# Patient Record
Sex: FEMALE | Employment: FULL TIME | URBAN - METROPOLITAN AREA
[De-identification: names, ages, dates, MRNs, and addresses within clinical notes are randomized per-mention and may not be internally consistent; named-entity substitution may affect disease eponyms.]

---

## 2018-12-24 ENCOUNTER — OFFICE VISIT (OUTPATIENT)
Dept: PRIMARY CARE CLINIC | Age: 43
End: 2018-12-24

## 2018-12-24 VITALS
RESPIRATION RATE: 18 BRPM | DIASTOLIC BLOOD PRESSURE: 84 MMHG | HEART RATE: 89 BPM | HEIGHT: 65 IN | SYSTOLIC BLOOD PRESSURE: 124 MMHG | WEIGHT: 275.2 LBS | OXYGEN SATURATION: 96 % | BODY MASS INDEX: 45.85 KG/M2 | TEMPERATURE: 98.6 F

## 2018-12-24 DIAGNOSIS — G43.109 MIGRAINE WITH AURA AND WITHOUT STATUS MIGRAINOSUS, NOT INTRACTABLE: ICD-10-CM

## 2018-12-24 DIAGNOSIS — Z12.4 SCREENING FOR CERVICAL CANCER: ICD-10-CM

## 2018-12-24 DIAGNOSIS — E66.01 OBESITY, MORBID (HCC): Primary | ICD-10-CM

## 2018-12-24 DIAGNOSIS — Z12.39 BREAST CANCER SCREENING: ICD-10-CM

## 2018-12-24 DIAGNOSIS — Z76.89 ENCOUNTER TO ESTABLISH CARE: ICD-10-CM

## 2018-12-24 PROBLEM — Z87.39 HISTORY OF FUSION OF SPINE FOR SCOLIOSIS: Status: ACTIVE | Noted: 2018-12-24

## 2018-12-24 PROBLEM — F90.9 ADHD: Status: ACTIVE | Noted: 2018-12-24

## 2018-12-24 PROBLEM — Z98.1 HISTORY OF FUSION OF SPINE FOR SCOLIOSIS: Status: ACTIVE | Noted: 2018-12-24

## 2018-12-24 RX ORDER — DEXTROAMPHETAMINE SACCHARATE, AMPHETAMINE ASPARTATE, DEXTROAMPHETAMINE SULFATE AND AMPHETAMINE SULFATE 5; 5; 5; 5 MG/1; MG/1; MG/1; MG/1
20 TABLET ORAL 2 TIMES DAILY
Status: ON HOLD | COMMUNITY
End: 2021-01-15

## 2018-12-24 RX ORDER — RIZATRIPTAN BENZOATE 10 MG/1
10 TABLET ORAL
Status: ON HOLD | COMMUNITY
End: 2021-01-15

## 2018-12-24 RX ORDER — BISMUTH SUBSALICYLATE 262 MG
1 TABLET,CHEWABLE ORAL DAILY
Status: ON HOLD | COMMUNITY
End: 2021-01-15

## 2018-12-24 NOTE — PATIENT INSTRUCTIONS
Eating Healthy Foods: Care Instructions  Your Care Instructions    Eating healthy foods can help lower your risk for disease. Healthy food gives you energy and keeps your heart strong, your brain active, your muscles working, and your bones strong. A healthy diet includes a variety of foods from the basic food groups: grains, vegetables, fruits, milk and milk products, and meat and beans. Some people may eat more of their favorite foods from only one food group and, as a result, miss getting the nutrients they need. So, it is important to pay attention not only to what you eat but also to what you are missing from your diet. You can eat a healthy, balanced diet by making a few small changes. Follow-up care is a key part of your treatment and safety. Be sure to make and go to all appointments, and call your doctor if you are having problems. It's also a good idea to know your test results and keep a list of the medicines you take. How can you care for yourself at home? Look at what you eat  · Keep a food diary for a week or two and record everything you eat or drink. Track the number of servings you eat from each food group. · For a balanced diet every day, eat a variety of:  ? 6 or more ounce-equivalents of grains, such as cereals, breads, crackers, rice, or pasta, every day. An ounce-equivalent is 1 slice of bread, 1 cup of ready-to-eat cereal, or ½ cup of cooked rice, cooked pasta, or cooked cereal.  ? 2½ cups of vegetables, especially:  § Dark-green vegetables such as broccoli and spinach. § Orange vegetables such as carrots and sweet potatoes. § Dry beans (such as diop and kidney beans) and peas (such as lentils). ? 2 cups of fresh, frozen, or canned fruit. A small apple or 1 banana or orange equals 1 cup. ? 3 cups of nonfat or low-fat milk, yogurt, or other milk products. ? 5½ ounces of meat and beans, such as chicken, fish, lean meat, beans, nuts, and seeds.  One egg, 1 tablespoon of peanut butter, ½ ounce nuts or seeds, or ¼ cup of cooked beans equals 1 ounce of meat. · Learn how to read food labels for serving sizes and ingredients. Fast-food and convenience-food meals often contain few or no fruits or vegetables. Make sure you eat some fruits and vegetables to make the meal more nutritious. · Look at your food diary. For each food group, add up what you have eaten and then divide the total by the number of days. This will give you an idea of how much you are eating from each food group. See if you can find some ways to change your diet to make it more healthy. Start small  · Do not try to make dramatic changes to your diet all at once. You might feel that you are missing out on your favorite foods and then be more likely to fail. · Start slowly, and gradually change your habits. Try some of the following:  ? Use whole wheat bread instead of white bread. ? Use nonfat or low-fat milk instead of whole milk. ? Eat brown rice instead of white rice, and eat whole wheat pasta instead of white-flour pasta. ? Try low-fat cheeses and low-fat yogurt. ? Add more fruits and vegetables to meals and have them for snacks. ? Add lettuce, tomato, cucumber, and onion to sandwiches. ? Add fruit to yogurt and cereal.  Enjoy food  · You can still eat your favorite foods. You just may need to eat less of them. If your favorite foods are high in fat, salt, and sugar, limit how often you eat them, but do not cut them out entirely. · Eat a wide variety of foods. Make healthy choices when eating out  · The type of restaurant you choose can help you make healthy choices. Even fast-food chains are now offering more low-fat or healthier choices on the menu. · Choose smaller portions, or take half of your meal home. · When eating out, try:  ? A veggie pizza with a whole wheat crust or grilled chicken (instead of sausage or pepperoni).   ? Pasta with roasted vegetables, grilled chicken, or marinara sauce instead of cream sauce. ? A vegetable wrap or grilled chicken wrap. ? Broiled or poached food instead of fried or breaded items. Make healthy choices easy  · Buy packaged, prewashed, ready-to-eat fresh vegetables and fruits, such as baby carrots, salad mixes, and chopped or shredded broccoli and cauliflower. · Buy packaged, presliced fruits, such as melon or pineapple. · Choose 100% fruit or vegetable juice instead of soda. Limit juice intake to 4 to 6 oz (½ to ¾ cup) a day. · Blend low-fat yogurt, fruit juice, and canned or frozen fruit to make a smoothie for breakfast or a snack. Where can you learn more? Go to http://fidel-soraida.info/. Enter T756 in the search box to learn more about \"Eating Healthy Foods: Care Instructions. \"  Current as of: March 29, 2018  Content Version: 11.8  © 7842-7575 [employer], [employer] Care instructions adapted under license by [employer] (which disclaims liability or warranty for this information). If you have questions about a medical condition or this instruction, always ask your healthcare professional. [place] [address] any warranty or liability for your use of this information.

## 2018-12-24 NOTE — PROGRESS NOTES
HPI:     Chief Complaint   Patient presents with   [address]        Patient is a 41 y.o. female who presents as new patient to establish care. Patient has history of ADHD, migraines, and obesity. Recently moved to the area from [place] about a year ago. Patient works as a . ADHD - diagnosed in 2010. Doing well with current therapy, Adderall 20mg BID. Denies any medication side effects. She is in the process of establishing with psych here. Migraine - reports having migraines since age 17. Reports 2-3 migraines per month. Associated symptoms include photophobia and phonophobia. Describes aura symptoms of visual floaters or dizziness. Denies any nausea, vomiting, weakness, paresthesias with headaches. Reports last migraine was earlier this month. As needed Maxalt has been working very well for her. Obesity - reports lap band surgery in 2008 and successfully lost 90 lbs which she was able to keep off for 8 years. However lap band failed in 2016 and was removed. Has since gained weight back. Patient reports she is eligible for revision, but is not interested at this time. She would rather work on diet and exercise to lose weight. She would like to meet with dietician. Patient has not yet started screening for breast cancer, she is interested in getting mammogram.   Patient would also like referral to OB/GYN for routine exam, pap. Reports history of irregular periods. Patient denies fever, chills, dizziness, headache, fatigue, syncope, chest pain, palpitations, dyspnea, abdominal pain, change in appetite, nausea, vomiting, constipation, and diarrhea. Patient Active Problem List   Diagnosis Code    Ob[facility] morbid ([facility] E66.01    Migraine with aura and without status migrainosus, not intractable G43. 109    ADHD F90.9    History of fusion of spine for scoliosis Z98.1, Z87.39     Current Outpatient Medications   Medication Sig Dispense Refill  dextroamphetamine-amphetamine (ADDERALL) 20 mg tablet Take 20 mg by mouth two (2) times a day.  rizatriptan (MAXALT) 10 mg tablet Take 10 mg by mouth once as needed for Migraine. May repeat in 2 hours if needed      multivitamin (ONE A DAY) tablet Take 1 Tab by mouth daily. Allergies   Allergen Reactions    Pcn [Penicillins] Rash     Facial rash     Past Medical History:   Diagnosis Date    ADHD     History of fusion of spine for scoliosis     Migraines     Osteoarthritis     lower spine     Past Surgical History:   Procedure Laterality Date    HX  SECTION      x1    HX CHOLECYSTECTOMY  2011    HX LAP GASTRIC BYPASS  2008    HX OTHER SURGICAL      double spinal fusion    HX OTHER SURGICAL  2016    lap band removal    HX TONSILLECTOMY      HX WISDOM TEETH EXTRACTION       Family History   Problem Relation Age of Onset    Other Mother         cerebral palsy    Osteoporosis Mother     Heart Disease Mother         Herlene Clonts valve\"   Narvis Zhang Migraines Mother     Hypertension Father     COPD Father     Macular Degen Father     Lung Cancer Maternal Grandmother     Thyroid Disease Maternal Grandmother         hashimoto    Stroke Maternal Grandfather     Cancer Paternal Grandmother         leukemia    Thyroid Disease Maternal Aunt         hashimoto    Heart Attack Paternal Aunt      Social History     Tobacco Use    Smoking status: Never Smoker    Smokeless tobacco: Never Used   Substance Use Topics    Alcohol use: No     Frequency: Never          ROS:   Pertinent items are noted in HPI. Objective:     Vitals:    18 0855   BP: 124/84   Pulse: 89   Resp: 18   Temp: 98.6 °F (37 °C)   TempSrc: Oral   SpO2: 96%   Weight: 275 lb 3.2 oz (124.8 kg)   Height: 5' 4.5\" (1.638 m)        Vitals and Nurse Documentation reviewed.     Physical Examination:   General appearance - alert, well appearing, and in no distress  Mental status - alert, oriented to person, place, and time, normal mood, behavior, speech, dress, motor activity, and thought processes  Eyes - pupils equal and reactive, sclera white, conjunctiva pink   Ears - bilateral TM's and external ear canals normal  Nose - normal and patent, no erythema, discharge or polyps  Mouth - mucous membranes moist, pharynx normal without lesions  Neck - supple, no significant adenopathy  Chest - clear to auscultation, no wheezes, rales or rhonchi, symmetric air entry  Heart - normal rate, regular rhythm, normal S1, S2, no murmurs, rubs, clicks or gallops  Abdomen - soft, nontender, nondistended, no masses or organomegaly  Neurological - alert, oriented, normal speech, no focal findings or movement disorder noted  Extremities - peripheral pulses normal, no pedal edema, no clubbing or cyanosis      Assessment/ Plan:   Diagnoses and all orders for this visit:    1. Obesity, morbid ([address]  -     History of lap band in 2008, with removal in 2016. Not interested in revision at this time. She is working on diet and exercise. -     Discussed lifestyle changes, daily physical activity, and advised 150 minutes of exercise weekly. Discussed healthy diet choices and limiting fried, fatty foods, fast foods, processed foods, sugar-sweetened beverages/soda, and added sugars. Increase fruits, vegetables, low-fat dairy products, lean proteins, and whole grains.    -     Patient would like to meet with dietician.    -     REFERRAL TO DIETITIAN    2. Migraine with aura and without status migrainosus, not intractable        -     Stable. Migraines 2-3 times per month, well controlled with as needed Maxalt    3. Breast cancer screening  -     [facility] 3D FILI W MAMMO BI SCREENING INCL CAD; Future    4. Screening for cervical cancer  -     REFERRAL TO OBSTETRICS AND GYNECOLOGY    5. Encounter to establish care    Patient is interested in getting flu shot, but reports she has a cold and would prefer to wait until she is feeling better.     Patient reports that she received Tdap in 2016. She will obtain records and bring next time. Follow-up Disposition:  Return in about 4 weeks (around 1/21/2019) for Return for CPE (Physical Exam), fasting labs. I have discussed the diagnosis with the patient and the intended plan as seen in the above orders. Advised prompt follow-up if symptoms worsen or fail to improve and symptoms that would warrant emergent evaluation in ED. The patient has received an after-visit summary and questions were answered concerning future plans. I have discussed medication side effects and warnings with the patient as well. Patient expressed understanding and is in agreement with the diagnosis and plan.

## 2019-07-10 ENCOUNTER — HOSPITAL ENCOUNTER (EMERGENCY)
Age: 44
Discharge: HOME | End: 2019-07-10
Attending: EMERGENCY MEDICINE | Admitting: EMERGENCY MEDICINE
Payer: OTHER GOVERNMENT

## 2019-07-10 VITALS
RESPIRATION RATE: 12 BRPM | BODY MASS INDEX: 49.7 KG/M2 | DIASTOLIC BLOOD PRESSURE: 100 MMHG | WEIGHT: 293 LBS | HEART RATE: 113 BPM | OXYGEN SATURATION: 98 % | TEMPERATURE: 98.5 F | SYSTOLIC BLOOD PRESSURE: 155 MMHG

## 2019-07-10 DIAGNOSIS — M62.838 MUSCLE SPASM: ICD-10-CM

## 2019-07-10 DIAGNOSIS — S76.011A STRAIN OF FLEXOR MUSCLE OF RIGHT HIP, INITIAL ENCOUNTER: Primary | ICD-10-CM

## 2019-07-10 PROCEDURE — 74011250637 HC RX REV CODE- 250/637: Performed by: EMERGENCY MEDICINE

## 2019-07-10 PROCEDURE — 99282 EMERGENCY DEPT VISIT SF MDM: CPT

## 2019-07-10 PROCEDURE — 75810000123 HC INJ'S ANES/STEROID AGT PERIPH NERVE

## 2019-07-10 PROCEDURE — 74011000250 HC RX REV CODE- 250: Performed by: EMERGENCY MEDICINE

## 2019-07-10 RX ORDER — BUPIVACAINE HYDROCHLORIDE 5 MG/ML
10 INJECTION, SOLUTION EPIDURAL; INTRACAUDAL
Status: COMPLETED | OUTPATIENT
Start: 2019-07-11 | End: 2019-07-10

## 2019-07-10 RX ORDER — ACETAMINOPHEN 500 MG
1000 TABLET ORAL
Status: COMPLETED | OUTPATIENT
Start: 2019-07-11 | End: 2019-07-10

## 2019-07-10 RX ORDER — IBUPROFEN 400 MG/1
800 TABLET ORAL
Status: DISCONTINUED | OUTPATIENT
Start: 2019-07-11 | End: 2019-07-10

## 2019-07-10 RX ORDER — IBUPROFEN 800 MG/1
800 TABLET ORAL
Qty: 20 TAB | Refills: 0 | Status: SHIPPED | OUTPATIENT
Start: 2019-07-10 | End: 2019-07-17

## 2019-07-10 RX ORDER — ACETAMINOPHEN 500 MG
1000 TABLET ORAL
Qty: 20 TAB | Refills: 0 | Status: ON HOLD | OUTPATIENT
Start: 2019-07-10 | End: 2021-01-15

## 2019-07-10 RX ORDER — IBUPROFEN 200 MG
200 TABLET ORAL
COMMUNITY
End: 2019-07-10 | Stop reason: ALTCHOICE

## 2019-07-10 RX ADMIN — ACETAMINOPHEN 1000 MG: 500 TABLET ORAL at 23:36

## 2019-07-10 RX ADMIN — BUPIVACAINE HYDROCHLORIDE 50 MG: 5 INJECTION, SOLUTION EPIDURAL; INTRACAUDAL; PERINEURAL at 23:32

## 2019-07-11 NOTE — ED TRIAGE NOTES
Triage: Pt was at a restaurant went to get up and had increasing pain to her right hip since earlier today.

## 2019-07-11 NOTE — ED PROVIDER NOTES
The history is provided by the patient. Hip Pain    This is a new problem. The current episode started 3 to 5 hours ago. The problem occurs constantly. The problem has been rapidly worsening. Pain location: right anterior and medial hip. The pain is severe. Associated symptoms include limited range of motion (2/2 pain and with lifting leg and hip flexion) and stiffness. Exacerbated by: sitting and standing. Treatments tried: ibuprofen. The treatment provided no relief. There has been no history of extremity trauma.         Past Medical History:   Diagnosis Date    ADHD     History of fusion of spine for scoliosis     Migraines     Osteoarthritis     lower spine       Past Surgical History:   Procedure Laterality Date    HX  SECTION      x1    HX CHOLECYSTECTOMY  2011    HX LAP GASTRIC BYPASS  2008    HX OTHER SURGICAL      double spinal fusion    HX OTHER SURGICAL  2016    lap band removal    HX TONSILLECTOMY      HX WISDOM TEETH EXTRACTION           Family History:   Problem Relation Age of Onset    Other Mother         cerebral palsy    Osteoporosis Mother     Heart Disease Mother         Nellene Bees valve\"   Alex Drain Migraines Mother     Hypertension Father     COPD Father     Macular Degen Father     Lung Cancer Maternal Grandmother     Thyroid Disease Maternal Grandmother         hashimoto    Stroke Maternal Grandfather     Cancer Paternal Grandmother         leukemia    Thyroid Disease Maternal Aunt         hashimoto    Heart Attack Paternal Aunt        Social History     Socioeconomic History    Marital status:      Spouse name: Not on file    Number of children: Not on file    Years of education: Not on file    Highest education level: Not on file   Occupational History    Not on file   Social Needs    Financial resource strain: Not on file    Food insecurity:     Worry: Not on file     Inability: Not on file    Transportation needs:     Medical: Not on file Non-medical: Not on file   Tobacco Use    Smoking status: Never Smoker    Smokeless tobacco: Never Used   Substance and Sexual Activity    Alcohol use: No     Frequency: Never    Drug use: No    Sexual activity: Yes     Partners: Male     Birth control/protection: Surgical     Comment: coils    Lifestyle    Physical activity:     Days per week: Not on file     Minutes per session: Not on file    Stress: Not on file   Relationships    Social connections:     Talks on phone: Not on file     Gets together: Not on file     Attends Nondenominational service: Not on file     Active member of club or organization: Not on file     Attends meetings of clubs or organizations: Not on file     Relationship status: Not on file    Intimate partner violence:     Fear of current or ex partner: Not on file     Emotionally abused: Not on file     Physically abused: Not on file     Forced sexual activity: Not on file   Other Topics Concern    Not on file   Social History Narrative    Not on file         ALLERGIES: Pcn [penicillins]    Review of Systems   Musculoskeletal: Positive for stiffness. All other systems reviewed and are negative. Vitals:    07/10/19 2314   BP: (!) 155/100   Pulse: (!) 113   Resp: 12   Temp: 98.5 °F (36.9 °C)   SpO2: 98%   Weight: 133.4 kg (294 lb 1.5 oz)            Physical Exam   Constitutional: She appears well-developed and well-nourished. No distress. HENT:   Head: Normocephalic and atraumatic. Eyes: Conjunctivae are normal.   Neck: Neck supple. Cardiovascular: Normal rate, regular rhythm and normal heart sounds. Pulmonary/Chest: Effort normal and breath sounds normal. No respiratory distress. Abdominal: She exhibits no distension. There is no tenderness. There is no rebound and no guarding. Musculoskeletal: She exhibits no deformity. Right hip: She exhibits tenderness (over hip flexor musculature and pain with passive extension of hip ).  She exhibits normal range of motion, normal strength, no bony tenderness, no swelling, no crepitus and no deformity. Neurological: She is alert. No cranial nerve deficit. Skin: Skin is warm and dry. Psychiatric: Her behavior is normal.   Nursing note and vitals reviewed. MDM     41 y.o. female presents with pain radiating across the front of her hip suddenly which appears consistent with a hip flexor strain and spasm. Local anaesthesia was applied to area and Pt went from unable to ambulate to moving around with minimal discomfort. Patient was recommended to take short course of scheduled NSAIDs and engage in early mobility as definitive treatment. Plan to follow up with PCP as needed and return precautions discussed for worsening or new concerning symptoms. Procedures    Procedure Note: Trigger Point Injection for Myofascial pain    Performed by Sarina Trevino MD  Indication: muscle/myofascial pain  Muscle body and tendon sheath of the right hip flexor muscle(s) were injected with 0.5% bupivacaine under sterile technique for release of muscle spasm/pain with ultrasound guidance to take care in avoiding the femoral nerve and vasculature. Patient tolerated well with immediate improvement of symptoms and no immediate complications following procedure.     CPT Code:     1 or 2 muscle bodies: [id number]

## 2019-07-29 ENCOUNTER — OFFICE VISIT (OUTPATIENT)
Dept: PRIMARY CARE CLINIC | Age: 44
End: 2019-07-29

## 2019-07-29 VITALS
HEART RATE: 98 BPM | TEMPERATURE: 98.5 F | HEIGHT: 65 IN | RESPIRATION RATE: 18 BRPM | DIASTOLIC BLOOD PRESSURE: 90 MMHG | SYSTOLIC BLOOD PRESSURE: 140 MMHG | BODY MASS INDEX: 49.7 KG/M2 | OXYGEN SATURATION: 97 %

## 2019-07-29 DIAGNOSIS — Z98.1 HISTORY OF FUSION OF SPINE FOR SCOLIOSIS: ICD-10-CM

## 2019-07-29 DIAGNOSIS — S76.011A STRAIN OF FLEXOR MUSCLE OF RIGHT HIP, INITIAL ENCOUNTER: Primary | ICD-10-CM

## 2019-07-29 DIAGNOSIS — Z87.39 HISTORY OF FUSION OF SPINE FOR SCOLIOSIS: ICD-10-CM

## 2019-07-29 RX ORDER — CYCLOBENZAPRINE HCL 10 MG
10 TABLET ORAL
Qty: 21 TAB | Refills: 0 | Status: ON HOLD | OUTPATIENT
Start: 2019-07-29 | End: 2021-01-15

## 2019-07-29 RX ORDER — CYCLOBENZAPRINE HCL 5 MG
10 TABLET ORAL
Qty: 21 TAB | Refills: 0 | Status: SHIPPED | OUTPATIENT
Start: 2019-07-29 | End: 2019-07-29

## 2019-07-29 RX ORDER — IBUPROFEN 200 MG
TABLET ORAL
Status: ON HOLD | COMMUNITY
End: 2021-01-15

## 2019-07-29 NOTE — PATIENT INSTRUCTIONS
Hip Flexor Strain: Rehab Exercises  Introduction  Here are some examples of exercises for you to try. The exercises may be suggested for a condition or for rehabilitation. Start each exercise slowly. Ease off the exercises if you start to have pain. You will be told when to start these exercises and which ones will work best for you. How to do the exercises  Pelvic tilt with marching    1. Lie on your back with your knees bent and your feet flat on the floor. 2. Tighten your belly muscles and buttocks, and press your lower back to the floor. 3. Keeping your knees bent, lift and then lower one leg up off the floor, and then lift and lower your other leg like you are marching. Each time you lift your leg, hold that position for about 6 seconds before lowering your leg. 4. Repeat 8 to 12 times. Scissors    1. Lie on your back with your knees bent at a 90-degree angle and your feet off the floor. 2. Tighten your belly muscles and buttocks, and press your lower back to the floor. 3. Slowly straighten one leg, and hold that position for about 6 seconds. Your leg should be about 12 inches off the floor. Bring that leg back to the starting position, and then straighten your other leg. Hold that position for about 6 seconds, and then switch legs again. 4. Repeat 8 to 12 times. Hamstring stretch (lying down)    1. Lie flat on your back with your legs straight. If you feel discomfort in your back, place a small towel roll under your lower back. 2. Holding the back of your affected leg for support, lift your leg straight up and toward your body until you feel a stretch at the back of your thigh. 3. Hold the stretch for at least 30 seconds. 4. Repeat 2 to 4 times. Quadricep and hip flexor stretch (lying on side)    1. Lie on your side with your good leg flat on the floor and your hand supporting your head.   2. Bend your top leg, and reach behind you to grab the front of that foot or ankle with your other hand.  3. Stretch your leg back by pulling your foot toward your buttock. You will feel the stretch in the front of your thigh. If this causes stress on your knee, do not do this stretch. 4. Hold the stretch for at least 15 to 30 seconds. 5. Repeat 2 to 4 times. Hip flexor stretch (kneeling)    1. Kneel on your affected leg and bend your good leg out in front of you, with that foot flat on the floor. If you feel discomfort in the front of your knee, place a towel under your knee. 2. Keeping your back straight, slowly push your hips forward until you feel a stretch in the upper thigh of your back leg and hip. 3. Hold the stretch for at least 15 to 30 seconds. 4. Repeat 2 to 4 times. Hip flexor stretch (edge of table)    1. Lie flat on your back on a table or flat bench, with your knees and lower legs hanging off the edge of the table. 2. Grab your good leg at the knee, and pull that knee back toward your chest. Relax your affected leg and let it hang down toward the floor until you feel a stretch in the upper thigh of your affected leg and hip. 3. Hold the stretch for at least 15 to 30 seconds. 4. Repeat 2 to 4 times. Follow-up care is a key part of your treatment and safety. Be sure to make and go to all appointments, and call your doctor if you are having problems. It's also a good idea to know your test results and keep a list of the medicines you take. Where can you learn more? Go to http://fidel-soraida.info/. Enter D310 in the search box to learn more about \"Hip Flexor Strain: Rehab Exercises. \"  Current as of: September 20, 2018  Content Version: 12.1  © 2235-5674 [employer]. Care instructions adapted under license by [employer] (which disclaims liability or warranty for this information).  If you have questions about a medical condition or this instruction, always ask your healthcare professional. Jennifer [employer] disclaims any warranty or liability for your use of this information.

## 2019-07-29 NOTE — PROGRESS NOTES
HPI:     Chief Complaint   Patient presents with    Groin Pain     started 2 weeks ago after sitting in car for 3 hours, shooting pains in middle of groin, can barely walk because of the pain, yesterday pain started to get worse and now is in pain all the time. Patient denies it being legs. Patient is a 41 y.o. female with significant history of ADHD, migraines, obesity who presents for evaluation of inner hip/groin pain.       Patient reports 3 week history of intermittent right anterior medial hip/groin pain. Reports her symptoms started after prolonged standing at an event in [place]. Symptoms were exacerbated during the 2-3 hour car ride home to [place]. Otherwise denies any known trauma or change in her level of activity. She was evaluated at [facility] on 7/10/19 for her symptoms and diagnosed with hip flexor strain/spasm. Trigger point injection was provided to the area with great relief. Today patient reports that symptoms improved after her ED visit, but worsened again yesterday. She describes pain as severe, sharp. She has difficulty ambulating due to the pain. Lifting her right leg is painful. Feels stiff. Reports that pain is aggravated by activity and ambulation and alleviated by rest.  She has very minimal discomfort at rest.  She has been taking ibuprofen and tylenol with some relief. She reports history of similar \"twinge\" in same area in the past, but usually resolves on its own. Does have history of back pain/spinal OA/hx of spinal fusion for scoliosis. She denies any worsening of her back pain from baseline. Denies LE radiation pain, numbness, paresthesias, weakness, bowel/bladder dysfunction, saddle anesthesia, abnormal vaginal bleeding, hematuria, fevers. Patient Active Problem List   Diagnosis Code    Obesity, morbid ([facility] [address] E66.01    Migraine with aura and without status migrainosus, not intractable G43. 109    ADHD F90.9    History of fusion of spine for scoliosis Z98.1, Z87.39     Current Outpatient Medications   Medication Sig Dispense Refill    ibuprofen (MOTRIN) 200 mg tablet Take  by mouth every six (6) hours as needed for Pain.  cyclobenzaprine (FLEXERIL) 5 mg tablet Take 2 Tabs by mouth three (3) times daily as needed for Muscle Spasm(s). 21 Tab 0    acetaminophen (TYLENOL) 500 mg tablet Take 2 Tabs by mouth every six (6) hours as needed for Pain. 20 Tab 0    multivitamin (ONE A DAY) tablet Take 1 Tab by mouth daily.  dextroamphetamine-amphetamine (ADDERALL) 20 mg tablet Take 20 mg by mouth two (2) times a day.  rizatriptan (MAXALT) 10 mg tablet Take 10 mg by mouth once as needed for Migraine. May repeat in 2 hours if needed       Allergies   Allergen Reactions    Pcn [Penicillins] Rash     Facial rash     Past Medical History:   Diagnosis Date    ADHD     History of fusion of spine for scoliosis     Migraines     Osteoarthritis     lower spine          ROS:   Pertinent items are noted in HPI. Objective:     Vitals:    07/29/19 1527   BP: 140/90   Pulse: 98   Resp: 18   Temp: 98.5 °F (36.9 °C)   TempSrc: Oral   SpO2: 97%   Height: 5' 4.5\" (1.638 m)        Vitals and Nurse Documentation reviewed. Physical Examination:   General appearance - alert, well appearing, and in no distress  Mental status - alert, oriented to person, place, and time, normal mood, behavior, speech, dress, motor activity, and thought processes  Chest - clear to auscultation, no wheezes, rales or rhonchi, symmetric air entry  Heart - normal rate, regular rhythm, normal S1, S2, no murmurs, rubs, clicks or gallops  Neurological - alert, oriented, normal speech, no focal findings or movement disorder noted  Musculoskeletal - exam greatly limited by patient discomfort and habitus. Unable to step onto exam table. She has tenderness to palpation of right anterior medial hip flexor musculature/groin. ROM is limited by patient discomfort.   No obvious deformity noted.   Extremities - peripheral pulses normal, no pedal edema, no clubbing or cyanosis, no edema, redness or tenderness in the calves or thighs    Assessment/ Plan:   Diagnoses and all orders for this visit:    1. Strain of flexor muscle of right hip, initial encounter  -     Patient presentation and exam findings consistent with hip flexor strain/spasm. She did improve after trigger point injection in ED and ibuprofen/tylenol. However symptoms returned yesterday. May take flexeril as needed. Will refer to PT and orthopedics for further evaluation. Discussed importance of staying active, avoiding bedrest, muscle strengthening, while avoiding strenuous activity for best long term outcomes. May use ice/heat as needed. -     cyclobenzaprine (FLEXERIL) 10 mg tablet; Take 1 Tab by mouth three (3) times daily as needed for Muscle Spasm(s). Medication benefits, risks, indication, dosage, potential adverse effects, and alternate medication options were discussed with patient who expressed understanding. Advised that med may cause drowsiness, sedation, dizziness and to take at bedtime. Do not drink alcohol while taking med or drive.         -     REFERRAL TO PHYSICAL THERAPY  -     REFERRAL TO ORTHOPEDICS    2. History of fusion of spine for scoliosis  -     REFERRAL TO ORTHOPEDICS       Follow-up and Dispositions    · Return if symptoms worsen or fail to improve. I have discussed the diagnosis with the patient and the intended plan as seen in the above orders. Advised prompt follow-up if symptoms worsen or fail to improve and symptoms that would warrant emergent evaluation in ED. The patient has received an after-visit summary and questions were answered concerning future plans. I have discussed medication side effects and warnings with the patient as well. Patient expressed understanding and is in agreement with the diagnosis and plan.

## 2019-07-29 NOTE — PROGRESS NOTES
Malik Perez is a 41 y.o. female    Chief Complaint   Patient presents with    Groin Pain     started 2 weeks ago after sitting in car for 3 hours, shooting pains in middle of groin, can barely walk because of the pain, yesterday pain started to get worse and now is in pain all the time. Patient denies it being legs. 1. Have you been to the ER, urgent care clinic since your last visit? Hospitalized since your last visit? [facility] 7/10/19 For same reason    2. Have you seen or consulted any other health care providers outside of the [address] [place] [address] since your last visit? Include any pap smears or colon screening. No    No flowsheet data found.      Health Maintenance Due   Topic Date Due    BREAST CANCER SCRN MAMMOGRAM  12/16/1995    DTaP/Tdap/Td series (1 - Tdap) 12/16/1998    PAP AKA CERVICAL CYTOLOGY  12/16/1998

## 2019-09-17 ENCOUNTER — OFFICE VISIT (OUTPATIENT)
Dept: BEHAVIORAL/MENTAL HEALTH CLINIC | Age: 44
End: 2019-09-17

## 2019-09-17 ENCOUNTER — TELEPHONE (OUTPATIENT)
Dept: NEUROLOGY | Age: 44
End: 2019-09-17

## 2019-09-17 VITALS
DIASTOLIC BLOOD PRESSURE: 99 MMHG | HEIGHT: 65 IN | BODY MASS INDEX: 48.48 KG/M2 | HEART RATE: 73 BPM | SYSTOLIC BLOOD PRESSURE: 126 MMHG | WEIGHT: 291 LBS

## 2019-09-17 DIAGNOSIS — F90.9 ATTENTION DEFICIT HYPERACTIVITY DISORDER (ADHD), UNSPECIFIED ADHD TYPE: Primary | ICD-10-CM

## 2019-09-17 RX ORDER — ESCITALOPRAM OXALATE 5 MG/1
5 TABLET ORAL DAILY
Qty: 30 TAB | Refills: 1 | Status: SHIPPED | OUTPATIENT
Start: 2019-09-17 | End: 2021-01-15
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
## 2019-09-17 NOTE — TELEPHONE ENCOUNTER
----- Message from González Land sent at 9/17/2019 10:59 AM EDT -----  Regarding: Dr. Ramsey Lux  Pt requesting a call back to schedule NP appt. Pt referred by Dr. Douglas Sullivan [phone] for neuro-psych exam. Best contact [phone].

## 2019-09-17 NOTE — TELEPHONE ENCOUNTER
Called referring Dr and requested referral and reason for testing. They are sending referral and notes as they are sensitive in system. Called pt and left v/m for return call.

## 2019-10-21 ENCOUNTER — HOSPITAL ENCOUNTER (OUTPATIENT)
Dept: MRI IMAGING | Age: 44
Discharge: HOME | End: 2019-10-21
Attending: ORTHOPAEDIC SURGERY
Payer: OTHER GOVERNMENT

## 2019-10-21 DIAGNOSIS — M51.36 DDD (DEGENERATIVE DISC DISEASE), LUMBAR: ICD-10-CM

## 2019-10-21 DIAGNOSIS — M41.9 KYPHOSCOLIOSIS: ICD-10-CM

## 2019-10-21 DIAGNOSIS — M43.10 SPONDYLISTHESIS: ICD-10-CM

## 2019-10-21 PROCEDURE — 72148 MRI LUMBAR SPINE W/O DYE: CPT

## 2019-11-27 ENCOUNTER — HOSPITAL ENCOUNTER (OUTPATIENT)
Dept: INTERVENTIONAL RADIOLOGY/VASCULAR | Age: 44
Discharge: HOME | End: 2019-11-27
Attending: PHYSICIAN ASSISTANT | Admitting: RADIOLOGY
Payer: OTHER GOVERNMENT

## 2019-11-27 VITALS
OXYGEN SATURATION: 100 % | RESPIRATION RATE: 16 BRPM | SYSTOLIC BLOOD PRESSURE: 151 MMHG | DIASTOLIC BLOOD PRESSURE: 95 MMHG | HEART RATE: 78 BPM

## 2019-11-27 DIAGNOSIS — M41.9 KYPHOSCOLIOSIS AND SCOLIOSIS: ICD-10-CM

## 2019-11-27 DIAGNOSIS — M51.36 DDD (DEGENERATIVE DISC DISEASE), LUMBAR: ICD-10-CM

## 2019-11-27 PROCEDURE — 74011250636 HC RX REV CODE- 250/636: Performed by: RADIOLOGY

## 2019-11-27 PROCEDURE — 74011000250 HC RX REV CODE- 250: Performed by: RADIOLOGY

## 2019-11-27 PROCEDURE — 64493 INJ PARAVERT F JNT L/S 1 LEV: CPT

## 2019-11-27 RX ORDER — TRIAMCINOLONE ACETONIDE 40 MG/ML
80 INJECTION, SUSPENSION INTRA-ARTICULAR; INTRAMUSCULAR
Status: COMPLETED | OUTPATIENT
Start: 2019-11-27 | End: 2019-11-27

## 2019-11-27 RX ORDER — DEXAMETHASONE SODIUM PHOSPHATE 10 MG/ML
10 INJECTION INTRAMUSCULAR; INTRAVENOUS ONCE
Status: DISCONTINUED | OUTPATIENT
Start: 2019-11-27 | End: 2019-11-27

## 2019-11-27 RX ORDER — LIDOCAINE HYDROCHLORIDE 10 MG/ML
10 INJECTION, SOLUTION EPIDURAL; INFILTRATION; INTRACAUDAL; PERINEURAL
Status: COMPLETED | OUTPATIENT
Start: 2019-11-27 | End: 2019-11-27

## 2019-11-27 RX ADMIN — LIDOCAINE HYDROCHLORIDE 10 ML: 10 INJECTION, SOLUTION EPIDURAL; INFILTRATION; INTRACAUDAL; PERINEURAL at 08:23

## 2019-11-27 RX ADMIN — TRIAMCINOLONE ACETONIDE 80 MG: 40 INJECTION, SUSPENSION INTRA-ARTICULAR; INTRAMUSCULAR at 08:24

## 2019-11-27 NOTE — DISCHARGE INSTRUCTIONS
[facility]  Special Procedures/Radiology Department      Steroidal Injection      Go home and rest.     No vigorous physical activity today. Be aware that numbness and/or tingling can occur up to 24 hours after the injection. No driving today. Resume your previous diet. Resume your previous medications. Depending on your job, you may return to work in 25 to 48 hours. It may take up to one week after the injection to see a change or an improvement in your symptoms. Be sure to follow up with your physician. Tell your physician if the injection helped with your symptoms or if the injection did nothing for your symptoms. For minor discomfort, you can take Tylenol, as directed on the label. Other:  Side effects of medications used today have been reviewed. Notify us of nausea, itching, hives, dizziness, or anything else out of the ordinary. Should you experience any of these significant changes, please call [phone] between the hours of 7:30 am and 10 pm or [phone] after hours. After hours, ask the  to page the X-ray Technologist, and describe the problem to the technologist.     Rickey Marshall. Steroid Injection Discharge Instructions    General Information:   A steroid injection was performed today, placing a combination of a steroid and an anesthetic (numbing medicine) into the space around the nerves of your spine. This is done to treat back pain. It may take 7-10 days for the injection to reach its full potential.  This procedure can be done at any level of the spinal column, depending on where your pain is. Your doctor will have ordered the appropriate level to be treated prior to your coming in for the procedure. Home Care Instructions: You can resume your regular diet. Do not drink alcohol. You may notice that you have to use your pain medications less after your injection.   Some people do not notice much of a change in their pain after the first injection. If that is the case, it is worth your time to have a second one done. This is why these injections are sometimes ordered in a series of three. Keep the puncture site clean and dry for 24 hours, and then you may remove the dressing. Showering is acceptable after the bandage is removed. Follow-Up Instructions:  Please see your ordering doctor as he/she has requested. Let your doctor know if you have relief from your pain so they may schedule another injection for you if it is indicated.         Patient Signature:  Date: 11/27/2019  Discharging Nurse: Bassam Martinez RN

## 2019-11-27 NOTE — PROGRESS NOTES
0337 am- Dr. Zeny Ramirez in to talk with patient and  about steroid injection. Consent signed. 0820 am- Patient taken to Angio procedure room for injection. 0845 am- Discharge instructions reviewed with patient with good understanding. Patient taken to car via wheelchair with nurse at side.

## 2020-02-19 ENCOUNTER — OFFICE VISIT (OUTPATIENT)
Dept: PRIMARY CARE CLINIC | Age: 45
End: 2020-02-19

## 2020-02-19 VITALS
WEIGHT: 293 LBS | TEMPERATURE: 102.2 F | RESPIRATION RATE: 18 BRPM | HEART RATE: 112 BPM | DIASTOLIC BLOOD PRESSURE: 95 MMHG | HEIGHT: 65 IN | SYSTOLIC BLOOD PRESSURE: 138 MMHG | BODY MASS INDEX: 48.82 KG/M2 | OXYGEN SATURATION: 96 %

## 2020-02-19 DIAGNOSIS — R03.0 ELEVATED BLOOD PRESSURE READING: ICD-10-CM

## 2020-02-19 DIAGNOSIS — R50.9 FEVER AND CHILLS: ICD-10-CM

## 2020-02-19 DIAGNOSIS — R68.89 FLU-LIKE SYMPTOMS: Primary | ICD-10-CM

## 2020-02-19 DIAGNOSIS — R05.9 COUGH: ICD-10-CM

## 2020-02-19 LAB
FLUAV+FLUBV AG NOSE QL IA.RAPID: NEGATIVE POS/NEG
FLUAV+FLUBV AG NOSE QL IA.RAPID: NEGATIVE POS/NEG
VALID INTERNAL CONTROL?: YES

## 2020-02-19 RX ORDER — OSELTAMIVIR PHOSPHATE 75 MG/1
75 CAPSULE ORAL 2 TIMES DAILY
Qty: 10 CAP | Refills: 0 | Status: SHIPPED | OUTPATIENT
Start: 2020-02-19 | End: 2020-02-24

## 2020-02-19 NOTE — LETTER
NOTIFICATION RETURN TO WORK / SCHOOL 
 
2/19/2020 11:17 AM 
 
Ms. Neri Eduardo 
[address] [address] To Whom It May Concern: 
 
Irwin Randhawa is currently under the care of Rodger Blum. Please excuse from work 2/19/20-2/21/20. If there are questions or concerns please have the patient contact our office.  
 
 
 
Sincerely, 
 
 
Conchita Pa NP

## 2020-02-19 NOTE — PROGRESS NOTES
HPI:     Chief Complaint   Patient presents with    Fever     temp 99.1-102.1    Chills    Cough     dry cough x almost 3 days        Patient is a 45 y.o. female with significant history of ADHD, migraines who presents for evaluation of flu-like symptoms. Patient reports 2 day history of fever, chills, malaise, dry nonproductive cough, scratchy throat, mild congestion, rhinorrhea, nausea. Tmax 102 at home. Has been alternating acetaminophen and ibuprofen. Denies dyspnea, wheezing, difficulty breathing, chest pain, abdominal pain, vomiting, diarrhea, urinary/UTI symptoms. Has been taking Dayquil with temporary relief. Denies known sick contacts. She did not get flu shot this season. Patient Active Problem List   Diagnosis Code    Obesity, morbid ([facility] [address] E66.01    Migraine with aura and without status migrainosus, not intractable G43. 109    ADHD F90.9    History of fusion of spine for scoliosis Z98.1, Z87.39     Current Outpatient Medications   Medication Sig Dispense Refill    escitalopram oxalate (LEXAPRO) 5 mg tablet Take 1 Tab by mouth daily. 30 Tab 1    ibuprofen (MOTRIN) 200 mg tablet Take  by mouth every six (6) hours as needed for Pain.  cyclobenzaprine (FLEXERIL) 10 mg tablet Take 1 Tab by mouth three (3) times daily as needed for Muscle Spasm(s). 21 Tab 0    acetaminophen (TYLENOL) 500 mg tablet Take 2 Tabs by mouth every six (6) hours as needed for Pain. 20 Tab 0    dextroamphetamine-amphetamine (ADDERALL) 20 mg tablet Take 20 mg by mouth two (2) times a day.  rizatriptan (MAXALT) 10 mg tablet Take 10 mg by mouth once as needed for Migraine. May repeat in 2 hours if needed      multivitamin (ONE A DAY) tablet Take 1 Tab by mouth daily.        Allergies   Allergen Reactions    Pcn [Penicillins] Rash     Facial rash     Past Medical History:   Diagnosis Date    ADHD     History of fusion of spine for scoliosis     Migraines     Osteoarthritis     lower spine          ROS: Pertinent items are noted in HPI. Objective:     Vitals:    02/19/20 1045   BP: (!) 138/95   Pulse: (!) 112   Resp: 18   Temp: (!) 102.2 °F (39 °C)   TempSrc: Oral   SpO2: 96%   Weight: 297 lb 12.8 oz (135.1 kg)   Height: 5' 5\" (1.651 m)        Vitals and Nurse Documentation reviewed. Physical Examination:   General appearance - alert, well appearing, and in no distress  Mental status - alert, oriented to person, place, and time, normal mood, behavior, speech, dress, motor activity, and thought processes  Eyes - pupils equal and reactive, extraocular eye movements intact  Ears - bilateral TM's and external ear canals normal  Nose - mild mucosal congestion and clear rhinorrhea  Mouth - mucous membranes moist, pharynx normal without lesions  Neck - supple, no significant adenopathy  Chest - clear to auscultation, no wheezes, rales or rhonchi, symmetric air entry, no tachypnea, retractions or cyanosis  Heart - normal rate, regular rhythm, normal S1, S2, no murmurs, rubs, clicks or gallops  Neurological - alert, oriented, normal speech, no focal findings or movement disorder noted  Extremities - peripheral pulses normal, no pedal edema, no clubbing or cyanosis      Assessment/ Plan:   Diagnoses and all orders for this visit:    1. Flu-like symptoms  -     AMB POC LUIS M INFLUENZA A/B TEST is negative. Considering symptoms and patient presentation we discussed treatment for flu with Tamiflu. Pros and cons of medication were discussed. -     oseltamivir (TAMIFLU) 75 mg capsule; Take 1 Cap by mouth two (2) times a day for 5 days.  Medication benefits, risks, indication, dosage, potential adverse effects, and alternate medication options were discussed with patient who expressed understanding.   -     Acetaminophen or ibuprofen as needed for fever, myalgia.    -     Increase fluids and rest, tea with lemon/honey, throat lozenges, salt water gargles, cool mist humidifier, saline nasal spray, good hand hygiene, Theraflu. 2. Cough  -     XR CHEST PA LAT; Future. Will notify of results and any further recommendation/management. 3. Fever and chills  -     XR CHEST PA LAT; Future    4. Elevated blood pressure reading        -    Not feeling well today. Will continue to monitor. Advised to check BP at home and follow-up if persistently >140/90s. Follow-up and Dispositions    · Return in about 4 weeks (around 3/18/2020) for Return for CPE (Physical Exam), fasting labs. I have discussed the diagnosis with the patient and the intended plan as seen in the above orders. Advised prompt follow-up if symptoms worsen or fail to improve and symptoms that would warrant emergent evaluation in ED. The patient has received an after-visit summary and questions were answered concerning future plans. I have discussed medication side effects and warnings with the patient as well. Patient expressed understanding and is in agreement with the diagnosis and plan.

## 2020-02-20 ENCOUNTER — HOSPITAL ENCOUNTER (OUTPATIENT)
Dept: GENERAL RADIOLOGY | Age: 45
Discharge: HOME | End: 2020-02-20
Payer: OTHER GOVERNMENT

## 2020-02-20 ENCOUNTER — TELEPHONE (OUTPATIENT)
Dept: PRIMARY CARE CLINIC | Age: 45
End: 2020-02-20

## 2020-02-20 DIAGNOSIS — R50.9 FEVER AND CHILLS: ICD-10-CM

## 2020-02-20 DIAGNOSIS — R05.9 COUGH: ICD-10-CM

## 2020-02-20 PROCEDURE — 71046 X-RAY EXAM CHEST 2 VIEWS: CPT

## 2020-02-20 NOTE — TELEPHONE ENCOUNTER
----- Message from Jacinto Wilkins NP sent at 1/91/8408 12:17 PM EST -----  Please call patient:    CXR is normal, no pneumonia.

## 2020-09-10 ENCOUNTER — VIRTUAL VISIT (OUTPATIENT)
Dept: PRIMARY CARE CLINIC | Age: 45
End: 2020-09-10
Payer: OTHER GOVERNMENT

## 2020-09-10 DIAGNOSIS — Z82.61 FAMILY HISTORY OF RHEUMATOID ARTHRITIS: ICD-10-CM

## 2020-09-10 DIAGNOSIS — R73.01 ELEVATED FASTING GLUCOSE: ICD-10-CM

## 2020-09-10 DIAGNOSIS — M25.50 ARTHRALGIA, UNSPECIFIED JOINT: Primary | ICD-10-CM

## 2020-09-10 PROCEDURE — 99214 OFFICE O/P EST MOD 30 MIN: CPT | Performed by: NURSE PRACTITIONER

## 2020-09-10 RX ORDER — METHYLPREDNISOLONE 4 MG/1
TABLET ORAL
Qty: 1 DOSE PACK | Refills: 0 | Status: SHIPPED | OUTPATIENT
Start: 2020-09-10 | End: 2020-09-16

## 2020-09-10 NOTE — PROGRESS NOTES
[facility]   [address], [address] [facility]  P: [phone]  F: [phone]    SUBJECTIVE   Lee Beyer is a 45 y.o. female who is seen over telehealth for Joint Pain. HPI:  Endorses shoulder and hip pain and great toe pain and fingers chronic problem. Tried aleve, advil, heat and ice and soaking without relief. This is a recurrent problem. . Started on Friday and pain is significantly worse. History of left big toe pain that was red and swollen with gout dx. Colchicine was helpful 4 years ago. PMHx osteoarthritis in back. Father has RA . Denies tick bite. Outside and has pets.   Patient Active Problem List    Diagnosis    Obesity, morbid ([address]    Migraine with aura and without status migrainosus, not intractable    ADHD    History of fusion of spine for scoliosis          Past Medical History:   Diagnosis Date    ADHD     History of fusion of spine for scoliosis     Migraines     Osteoarthritis     lower spine     Past Surgical History:   Procedure Laterality Date    HX  SECTION      x1    HX CHOLECYSTECTOMY      HX LAP GASTRIC BYPASS  2008    HX OTHER SURGICAL      double spinal fusion    HX OTHER SURGICAL  2016    lap band removal    HX TONSILLECTOMY      HX WISDOM TEETH EXTRACTION      IR INJ FACET LUMBAR / SACRAL SINGLE  2019     Social History     Socioeconomic History    Marital status:      Spouse name: Not on file    Number of children: Not on file    Years of education: Not on file    Highest education level: Not on file   Occupational History    Not on file   Social Needs    Financial resource strain: Not on file    Food insecurity     Worry: Not on file     Inability: Not on file    Transportation needs     Medical: Not on file     Non-medical: Not on file   Tobacco Use    Smoking status: Never Smoker    Smokeless tobacco: Never Used   Substance and Sexual Activity    Alcohol use: No     Frequency: Never    Drug use: No    Sexual activity: Yes     Partners: Male     Birth control/protection: Surgical     Comment: coils    Lifestyle    Physical activity     Days per week: Not on file     Minutes per session: Not on file    Stress: Not on file   Relationships    Social connections     Talks on phone: Not on file     Gets together: Not on file     Attends Christian service: Not on file     Active member of club or organization: Not on file     Attends meetings of clubs or organizations: Not on file     Relationship status: Not on file    Intimate partner violence     Fear of current or ex partner: Not on file     Emotionally abused: Not on file     Physically abused: Not on file     Forced sexual activity: Not on file   Other Topics Concern    Not on file   Social History Narrative    Not on file     Family History   Problem Relation Age of Onset    Other Mother         cerebral palsy    Osteoporosis Mother     Heart Disease Mother         Virgene  valve\"    Migraines Mother     Hypertension Father     COPD Father     Macular Degen Father     Lung Cancer Maternal Grandmother     Thyroid Disease Maternal Grandmother         hashimoto    Stroke Maternal Grandfather     Cancer Paternal Grandmother         leukemia    Thyroid Disease Maternal Aunt         hashimoto    Heart Attack Paternal Aunt      Allergies   Allergen Reactions    Pcn [Penicillins] Rash     Facial rash       Current Outpatient Medications   Medication Sig Dispense Refill    escitalopram oxalate (LEXAPRO) 5 mg tablet Take 1 Tab by mouth daily. 30 Tab 1    ibuprofen (MOTRIN) 200 mg tablet Take  by mouth every six (6) hours as needed for Pain.  cyclobenzaprine (FLEXERIL) 10 mg tablet Take 1 Tab by mouth three (3) times daily as needed for Muscle Spasm(s). 21 Tab 0    acetaminophen (TYLENOL) 500 mg tablet Take 2 Tabs by mouth every six (6) hours as needed for Pain.  20 Tab 0    dextroamphetamine-amphetamine (ADDERALL) 20 mg tablet Take 20 mg by mouth two (2) times a day.  rizatriptan (MAXALT) 10 mg tablet Take 10 mg by mouth once as needed for Migraine. May repeat in 2 hours if needed      multivitamin (ONE A DAY) tablet Take 1 Tab by mouth daily. Denies Lyme titer    The medications were reviewed and updated in the medical record. The past medical history, past surgical history, and family history were reviewed and updated in the medical record. REVIEW OF SYSTEMS   Review of Systems   Constitutional: Negative for chills and fever. HENT: Negative for congestion. Respiratory: Negative for cough, shortness of breath and wheezing. Cardiovascular: Negative for chest pain and palpitations. Gastrointestinal: Negative for abdominal pain, constipation, diarrhea, nausea and vomiting. Musculoskeletal: Positive for joint pain. Negative for falls. Neurological: Negative for tingling, sensory change and weakness. PHYSICAL EXAM   NO VITALS WERE TAKEN FOR THIS VISIT  Physical Exam  Constitutional:       General: She is not in acute distress. Appearance: Normal appearance. She is obese. HENT:      Head: Normocephalic and atraumatic. Eyes:      General:         Right eye: No discharge. Left eye: No discharge. Pulmonary:      Effort: Pulmonary effort is normal. No respiratory distress. Musculoskeletal:      Comments: Diffuse joint pain   Neurological:      Mental Status: She is alert and oriented to person, place, and time. Psychiatric:         Attention and Perception: Attention normal.         Mood and Affect: Mood normal.         Speech: Speech normal.         Behavior: Behavior normal.           ASSESSMENT/ PLAN   Diagnoses and all orders for this visit:    1. Arthralgia, unspecified joint  -     CBC W/O DIFF; Future  -     METABOLIC PANEL, COMPREHENSIVE; Future  -     RA + CCP ABS; Future  -     methylPREDNISolone (MEDROL DOSEPACK) 4 mg tablet; use as directed    2.  Family history of rheumatoid arthritis  -     RA + CCP ABS; Future          Follow-up and Dispositions    · Return if symptoms worsen or fail to improve. I was in the office while conducting this encounter. Consent:  She and/or her healthcare decision maker is aware that this patient-initiated Telehealth encounter is a billable service, with coverage as determined by her insurance carrier. She is aware that she may receive a bill and has provided verbal consent to proceed: Yes    This virtual visit was conducted via [employer]. Pursuant to the emergency declaration under the [facility], [facility] waiver authority and the [employer] Act, this Virtual  Visit was conducted to reduce the patient's risk of exposure to COVID-19 and provide continuity of care for an established patient. Services were provided through a video synchronous discussion virtually to substitute for in-person clinic visit. Due to this being a TeleHealth evaluation, many elements of the physical examination are unable to be assessed. Total Time: minutes: 21-30 minutes. Disclaimer:  Advised patient to call back or return to office if symptoms worsen/change/persist.  Discussed expected course/resolution/complications of diagnosis in detail with patient. Medication risks/benefits/alternatives discussed with patient. Patient was given an after visit summary which includes diagnoses, current medications, & vitals. Discussed patient instructions and advised to read to all patient instructions regarding care. Patient expressed understanding with the diagnosis and plan. This note will not be viewable in [address]         Azael Florence NP  9/10/2020        (This document has been electronically signed)

## 2020-09-11 DIAGNOSIS — Z82.61 FAMILY HISTORY OF RHEUMATOID ARTHRITIS: ICD-10-CM

## 2020-09-11 DIAGNOSIS — M25.50 ARTHRALGIA, UNSPECIFIED JOINT: ICD-10-CM

## 2020-09-13 LAB
ALBUMIN SERPL-MCNC: 3.3 G/DL (ref 3.5–5)
ALBUMIN/GLOB SERPL: 1 {RATIO} (ref 1.1–2.2)
ALP SERPL-CCNC: 65 U/L (ref 45–117)
ALT SERPL-CCNC: 25 U/L (ref 12–78)
ANION GAP SERPL CALC-SCNC: 8 MMOL/L (ref 5–15)
AST SERPL-CCNC: 16 U/L (ref 15–37)
BILIRUB SERPL-MCNC: 0.4 MG/DL (ref 0.2–1)
BUN SERPL-MCNC: 18 MG/DL (ref 6–20)
BUN/CREAT SERPL: 27 (ref 12–20)
CALCIUM SERPL-MCNC: 8.7 MG/DL (ref 8.5–10.1)
CCP IGA+IGG SERPL IA-ACNC: 3 UNITS (ref 0–19)
CHLORIDE SERPL-SCNC: 106 MMOL/L (ref 97–108)
CO2 SERPL-SCNC: 23 MMOL/L (ref 21–32)
CREAT SERPL-MCNC: 0.67 MG/DL (ref 0.55–1.02)
ERYTHROCYTE [DISTWIDTH] IN BLOOD BY AUTOMATED COUNT: 13.3 % (ref 11.5–14.5)
GLOBULIN SER CALC-MCNC: 3.3 G/DL (ref 2–4)
GLUCOSE SERPL-MCNC: 133 MG/DL (ref 65–100)
HCT VFR BLD AUTO: 41.4 % (ref 35–47)
HGB BLD-MCNC: 13.7 G/DL (ref 11.5–16)
MCH RBC QN AUTO: 29.7 PG (ref 26–34)
MCHC RBC AUTO-ENTMCNC: 33.1 G/DL (ref 30–36.5)
MCV RBC AUTO: 89.8 FL (ref 80–99)
NRBC # BLD: 0 K/UL (ref 0–0.01)
NRBC BLD-RTO: 0 PER 100 WBC
PLATELET # BLD AUTO: 334 K/UL (ref 150–400)
PMV BLD AUTO: 10.1 FL (ref 8.9–12.9)
POTASSIUM SERPL-SCNC: 4.1 MMOL/L (ref 3.5–5.1)
PROT SERPL-MCNC: 6.6 G/DL (ref 6.4–8.2)
RBC # BLD AUTO: 4.61 M/UL (ref 3.8–5.2)
RHEUMATOID FACT SERPL-ACNC: <10 IU/ML (ref 0–13.9)
SODIUM SERPL-SCNC: 137 MMOL/L (ref 136–145)
WBC # BLD AUTO: 9.9 K/UL (ref 3.6–11)

## 2020-09-18 NOTE — PROGRESS NOTES
Results reviewed. Glucose elevated-unclear if lab was fasting. BUN creatinine ratio slightly elevated. All other labs look good. Instructed patient to increase fluids and stay active.

## 2020-09-22 ENCOUNTER — PATIENT MESSAGE (OUTPATIENT)
Dept: PRIMARY CARE CLINIC | Age: 45
End: 2020-09-22

## 2020-09-22 DIAGNOSIS — R73.01 ELEVATED FASTING GLUCOSE: Primary | ICD-10-CM

## 2020-09-24 LAB
EST. AVERAGE GLUCOSE BLD GHB EST-MCNC: 137 MG/DL
HBA1C MFR BLD: 6.4 % (ref 4–5.6)

## 2020-09-28 NOTE — PROGRESS NOTES
Hemoglobin A1c elevated at 6.4. Prediabetes. Recommend patient follow-up with registered dietitian to learn about diabetic diet. Messaged patient with results and her approval of plan to see dietitian.

## 2020-09-30 DIAGNOSIS — R73.03 PREDIABETES: ICD-10-CM

## 2020-09-30 DIAGNOSIS — E66.01 OBESITY, MORBID (HCC): Primary | ICD-10-CM

## 2020-11-25 RX ORDER — ESCITALOPRAM OXALATE 5 MG/1
5 TABLET ORAL DAILY
Qty: 30 TAB | Refills: 1 | OUTPATIENT
Start: 2020-11-25

## 2020-11-25 NOTE — TELEPHONE ENCOUNTER
Requested Prescriptions     Pending Prescriptions Disp Refills    escitalopram oxalate (LEXAPRO) 5 mg tablet 30 Tab 1     Sig: Take 1 Tab by mouth daily. Last office visit: 09/17/2019    No follow up scheduled. Last due was 11/2019. Your original script was for a 30 day supply 1 refill. Attempted to contact patient, calling the only number listed in CC. Voice mail is full. Will ask patient to call the office via the My Chart message she sent.

## 2020-11-30 ENCOUNTER — TELEPHONE (OUTPATIENT)
Dept: BEHAVIORAL/MENTAL HEALTH CLINIC | Age: 45
End: 2020-11-30

## 2020-11-30 NOTE — TELEPHONE ENCOUNTER
Spoke with patient. Two patient identifiers confirmed. Patient stated she was able to get the prescription filled. She does not wish to schedule a new patient appointment at this time. Patient had no further questions or concerns.

## 2020-11-30 NOTE — TELEPHONE ENCOUNTER
----- Message from Lisa Garcia NP sent at 11/25/2020  9:16 PM EST -----  Regarding: RE: Refill  Dillon Garza,  I cant refill her medication since she never followed up. Technically she would be a new pt but I could probably see her in a 30 minute slot. Is she asking for an appt or just a refill? Thanks,  Diamond Tang  ----- Message -----  From: Brady Lopez LPN  Sent: 66/82/7001   8:37 AM EST  To: Lisa Garcia NP  Subject: Refill                                           Ervin Freitas,   Ms. René Peters is requesting a refill of Lexapro. She was seen by you as a new patient in  09/2019. You asked her to follow up 11/2019. No follow up that I can see. My question to you is do you consider her to be a new patient since it has been a year and you only saw her once? Classie Sacks told me to pull the , which I did and there is nothing. I sent the refill request to you.       Cesar Bar complains of pain/discomfort

## 2020-11-30 NOTE — TELEPHONE ENCOUNTER
----- Message from Todd Terrell LPN sent at 18/77/0554 10:45 AM EST -----  Regarding: RE: Refill  I spoke with her today. She got the script filled elsewhere. She did not want to make  new patient appointment at this time. Pawan Michele  ----- Message -----  From: Pierre Infante NP  Sent: 11/25/2020   9:16 PM EST  To: Todd Terrell LPN  Subject: RE: Ata Mariscal,  I cant refill her medication since she never followed up. Technically she would be a new pt but I could probably see her in a 30 minute slot. Is she asking for an appt or just a refill? Thanks,  Eevr Bob  ----- Message -----  From: Rob Garza LPN  Sent: [id number]/41/8189   8:37 AM EST  To: Donis Hogue NP  Subject: Refill                                           Danita Pittman,   Ms. Kavitha Vines is requesting a refill of Lexapro. She was seen by you as a new patient in  09/2019. You asked her to follow up 11/2019. No follow up that I can see. My question to you is do you consider her to be a new patient since it has been a year and you only saw her once? Miriam Tolbert told me to pull the , which I did and there is nothing. I sent the refill request to you.       Pawan Michele

## 2021-01-14 ENCOUNTER — APPOINTMENT (OUTPATIENT)
Dept: CT IMAGING | Age: 46
DRG: 062 | End: 2021-01-14
Attending: EMERGENCY MEDICINE
Payer: OTHER GOVERNMENT

## 2021-01-14 ENCOUNTER — HOSPITAL ENCOUNTER (INPATIENT)
Age: 46
LOS: 2 days | Discharge: HOME | DRG: 062 | End: 2021-01-17
Attending: EMERGENCY MEDICINE | Admitting: INTERNAL MEDICINE
Payer: OTHER GOVERNMENT

## 2021-01-14 DIAGNOSIS — I63.9 ACUTE CVA (CEREBROVASCULAR ACCIDENT) (HCC): ICD-10-CM

## 2021-01-14 DIAGNOSIS — R47.02 DYSPHASIA: ICD-10-CM

## 2021-01-14 DIAGNOSIS — R53.1 ACUTE RIGHT-SIDED WEAKNESS: Primary | ICD-10-CM

## 2021-01-14 DIAGNOSIS — I16.1 HYPERTENSIVE EMERGENCY: ICD-10-CM

## 2021-01-14 DIAGNOSIS — R51.9 ACUTE NONINTRACTABLE HEADACHE, UNSPECIFIED HEADACHE TYPE: ICD-10-CM

## 2021-01-14 DIAGNOSIS — G43.109 COMPLICATED MIGRAINE: ICD-10-CM

## 2021-01-14 DIAGNOSIS — R47.01 APHASIA: ICD-10-CM

## 2021-01-14 LAB
ALBUMIN SERPL-MCNC: 3.6 G/DL (ref 3.5–5)
ALBUMIN/GLOB SERPL: 1 {RATIO} (ref 1.1–2.2)
ALP SERPL-CCNC: 58 U/L (ref 45–117)
ALT SERPL-CCNC: 34 U/L (ref 12–78)
ANION GAP SERPL CALC-SCNC: 14 MMOL/L (ref 5–15)
AST SERPL-CCNC: 19 U/L (ref 15–37)
BASOPHILS # BLD: 0.1 K/UL (ref 0–0.1)
BASOPHILS NFR BLD: 0 % (ref 0–1)
BILIRUB SERPL-MCNC: 0.3 MG/DL (ref 0.2–1)
BUN SERPL-MCNC: 21 MG/DL (ref 6–20)
BUN/CREAT SERPL: 33 (ref 12–20)
CALCIUM SERPL-MCNC: 8.8 MG/DL (ref 8.5–10.1)
CHLORIDE SERPL-SCNC: 103 MMOL/L (ref 97–108)
CO2 SERPL-SCNC: 22 MMOL/L (ref 21–32)
CREAT SERPL-MCNC: 0.64 MG/DL (ref 0.55–1.02)
DIFFERENTIAL METHOD BLD: ABNORMAL
EOSINOPHIL # BLD: 0.1 K/UL (ref 0–0.4)
EOSINOPHIL NFR BLD: 1 % (ref 0–7)
ERYTHROCYTE [DISTWIDTH] IN BLOOD BY AUTOMATED COUNT: 13.2 % (ref 11.5–14.5)
GLOBULIN SER CALC-MCNC: 3.7 G/DL (ref 2–4)
GLUCOSE BLD STRIP.AUTO-MCNC: 90 MG/DL (ref 65–100)
GLUCOSE SERPL-MCNC: 95 MG/DL (ref 65–100)
HCT VFR BLD AUTO: 42.8 % (ref 35–47)
HGB BLD-MCNC: 14.2 G/DL (ref 11.5–16)
IMM GRANULOCYTES # BLD AUTO: 0.1 K/UL (ref 0–0.04)
IMM GRANULOCYTES NFR BLD AUTO: 0 % (ref 0–0.5)
INR PPP: 1 (ref 0.9–1.1)
LYMPHOCYTES # BLD: 4.8 K/UL (ref 0.8–3.5)
LYMPHOCYTES NFR BLD: 35 % (ref 12–49)
MCH RBC QN AUTO: 29.3 PG (ref 26–34)
MCHC RBC AUTO-ENTMCNC: 33.2 G/DL (ref 30–36.5)
MCV RBC AUTO: 88.4 FL (ref 80–99)
MONOCYTES # BLD: 1.5 K/UL (ref 0–1)
MONOCYTES NFR BLD: 11 % (ref 5–13)
NEUTS SEG # BLD: 7.2 K/UL (ref 1.8–8)
NEUTS SEG NFR BLD: 53 % (ref 32–75)
NRBC # BLD: 0 K/UL (ref 0–0.01)
NRBC BLD-RTO: 0 PER 100 WBC
PLATELET # BLD AUTO: 352 K/UL (ref 150–400)
PMV BLD AUTO: 10 FL (ref 8.9–12.9)
POTASSIUM SERPL-SCNC: 3.7 MMOL/L (ref 3.5–5.1)
PROT SERPL-MCNC: 7.3 G/DL (ref 6.4–8.2)
PROTHROMBIN TIME: 9.9 SEC (ref 9–11.1)
RBC # BLD AUTO: 4.84 M/UL (ref 3.8–5.2)
SERVICE CMNT-IMP: NORMAL
SODIUM SERPL-SCNC: 139 MMOL/L (ref 136–145)
TROPONIN I SERPL-MCNC: <0.05 NG/ML
WBC # BLD AUTO: 13.8 K/UL (ref 3.6–11)

## 2021-01-14 PROCEDURE — 85610 PROTHROMBIN TIME: CPT

## 2021-01-14 PROCEDURE — 74011000258 HC RX REV CODE- 258: Performed by: EMERGENCY MEDICINE

## 2021-01-14 PROCEDURE — 74011000636 HC RX REV CODE- 636: Performed by: EMERGENCY MEDICINE

## 2021-01-14 PROCEDURE — 84484 ASSAY OF TROPONIN QUANT: CPT

## 2021-01-14 PROCEDURE — 74011250636 HC RX REV CODE- 250/636

## 2021-01-14 PROCEDURE — 96375 TX/PRO/DX INJ NEW DRUG ADDON: CPT

## 2021-01-14 PROCEDURE — 74011250636 HC RX REV CODE- 250/636: Performed by: EMERGENCY MEDICINE

## 2021-01-14 PROCEDURE — 85025 COMPLETE CBC W/AUTO DIFF WBC: CPT

## 2021-01-14 PROCEDURE — 4A03X5D MEASUREMENT OF ARTERIAL FLOW, INTRACRANIAL, EXTERNAL APPROACH: ICD-10-PCS | Performed by: STUDENT IN AN ORGANIZED HEALTH CARE EDUCATION/TRAINING PROGRAM

## 2021-01-14 PROCEDURE — 82962 GLUCOSE BLOOD TEST: CPT

## 2021-01-14 PROCEDURE — 80053 COMPREHEN METABOLIC PANEL: CPT

## 2021-01-14 PROCEDURE — 74011000250 HC RX REV CODE- 250: Performed by: EMERGENCY MEDICINE

## 2021-01-14 PROCEDURE — 3E03317 INTRODUCTION OF OTHER THROMBOLYTIC INTO PERIPHERAL VEIN, PERCUTANEOUS APPROACH: ICD-10-PCS | Performed by: INTERNAL MEDICINE

## 2021-01-14 PROCEDURE — 70498 CT ANGIOGRAPHY NECK: CPT

## 2021-01-14 PROCEDURE — 74011000636 HC RX REV CODE- 636

## 2021-01-14 PROCEDURE — 93005 ELECTROCARDIOGRAM TRACING: CPT

## 2021-01-14 PROCEDURE — 36415 COLL VENOUS BLD VENIPUNCTURE: CPT

## 2021-01-14 PROCEDURE — 70450 CT HEAD/BRAIN W/O DYE: CPT

## 2021-01-14 PROCEDURE — 96374 THER/PROPH/DIAG INJ IV PUSH: CPT

## 2021-01-14 RX ORDER — SODIUM CHLORIDE 9 MG/ML
10 INJECTION INTRAMUSCULAR; INTRAVENOUS; SUBCUTANEOUS ONCE
Status: COMPLETED | OUTPATIENT
Start: 2021-01-14 | End: 2021-01-14

## 2021-01-14 RX ORDER — KETOROLAC TROMETHAMINE 30 MG/ML
15 INJECTION, SOLUTION INTRAMUSCULAR; INTRAVENOUS
Status: DISCONTINUED | OUTPATIENT
Start: 2021-01-14 | End: 2021-01-14

## 2021-01-14 RX ORDER — LABETALOL HYDROCHLORIDE 5 MG/ML
20 INJECTION, SOLUTION INTRAVENOUS
Status: COMPLETED | OUTPATIENT
Start: 2021-01-14 | End: 2021-01-14

## 2021-01-14 RX ORDER — SODIUM CHLORIDE 9 MG/ML
50 INJECTION, SOLUTION INTRAVENOUS ONCE
Status: DISPENSED | OUTPATIENT
Start: 2021-01-14 | End: 2021-01-15

## 2021-01-14 RX ORDER — DIPHENHYDRAMINE HYDROCHLORIDE 50 MG/ML
25 INJECTION, SOLUTION INTRAMUSCULAR; INTRAVENOUS
Status: COMPLETED | OUTPATIENT
Start: 2021-01-14 | End: 2021-01-14

## 2021-01-14 RX ORDER — LABETALOL HYDROCHLORIDE 5 MG/ML
INJECTION, SOLUTION INTRAVENOUS
Status: DISPENSED
Start: 2021-01-14 | End: 2021-01-15

## 2021-01-14 RX ORDER — SODIUM CHLORIDE 9 MG/ML
50 INJECTION, SOLUTION INTRAVENOUS
Status: COMPLETED | OUTPATIENT
Start: 2021-01-14 | End: 2021-01-15

## 2021-01-14 RX ORDER — DEXAMETHASONE SODIUM PHOSPHATE 4 MG/ML
10 INJECTION, SOLUTION INTRA-ARTICULAR; INTRALESIONAL; INTRAMUSCULAR; INTRAVENOUS; SOFT TISSUE
Status: COMPLETED | OUTPATIENT
Start: 2021-01-14 | End: 2021-01-14

## 2021-01-14 RX ADMIN — LABETALOL HYDROCHLORIDE 20 MG: 5 INJECTION INTRAVENOUS at 22:17

## 2021-01-14 RX ADMIN — DEXAMETHASONE SODIUM PHOSPHATE 10 MG: 4 INJECTION, SOLUTION INTRAMUSCULAR; INTRAVENOUS at 21:57

## 2021-01-14 RX ADMIN — SODIUM CHLORIDE 10 MG: 9 INJECTION INTRAMUSCULAR; INTRAVENOUS; SUBCUTANEOUS at 22:18

## 2021-01-14 RX ADMIN — SODIUM CHLORIDE 1000 ML: 9 INJECTION, SOLUTION INTRAVENOUS at 21:54

## 2021-01-14 RX ADMIN — SODIUM CHLORIDE 50 ML: 900 INJECTION, SOLUTION INTRAVENOUS at 22:52

## 2021-01-14 RX ADMIN — ALTEPLASE 81 MG: KIT at 22:33

## 2021-01-14 RX ADMIN — SODIUM CHLORIDE 10 ML: 9 INJECTION INTRAMUSCULAR; INTRAVENOUS; SUBCUTANEOUS at 22:55

## 2021-01-14 RX ADMIN — ALTEPLASE 9 MG: KIT at 22:30

## 2021-01-14 RX ADMIN — IOPAMIDOL 100 ML: 755 INJECTION, SOLUTION INTRAVENOUS at 23:43

## 2021-01-14 RX ADMIN — IOPAMIDOL 100 ML: 755 INJECTION, SOLUTION INTRAVENOUS at 22:52

## 2021-01-14 RX ADMIN — DIPHENHYDRAMINE HYDROCHLORIDE 25 MG: 50 INJECTION, SOLUTION INTRAMUSCULAR; INTRAVENOUS at 21:55

## 2021-01-15 ENCOUNTER — APPOINTMENT (OUTPATIENT)
Dept: CT IMAGING | Age: 46
DRG: 062 | End: 2021-01-15
Attending: EMERGENCY MEDICINE
Payer: OTHER GOVERNMENT

## 2021-01-15 ENCOUNTER — APPOINTMENT (OUTPATIENT)
Dept: CT IMAGING | Age: 46
DRG: 062 | End: 2021-01-15
Attending: NURSE PRACTITIONER
Payer: OTHER GOVERNMENT

## 2021-01-15 ENCOUNTER — APPOINTMENT (OUTPATIENT)
Dept: MRI IMAGING | Age: 46
DRG: 062 | End: 2021-01-15
Attending: NURSE PRACTITIONER
Payer: OTHER GOVERNMENT

## 2021-01-15 ENCOUNTER — APPOINTMENT (OUTPATIENT)
Dept: NON INVASIVE DIAGNOSTICS | Age: 46
DRG: 062 | End: 2021-01-15
Attending: NURSE PRACTITIONER
Payer: OTHER GOVERNMENT

## 2021-01-15 PROBLEM — R47.01 APHASIA: Status: ACTIVE | Noted: 2021-01-15

## 2021-01-15 LAB
ATRIAL RATE: 80 BPM
BNP SERPL-MCNC: 34 PG/ML
CALCULATED P AXIS, ECG09: 20 DEGREES
CALCULATED R AXIS, ECG10: -18 DEGREES
CALCULATED T AXIS, ECG11: 6 DEGREES
DIAGNOSIS, 93000: NORMAL
ECHO AO ROOT DIAM: 3.36 CM
ECHO AV AREA PEAK VELOCITY: 2.45 CM2
ECHO AV AREA/BSA PEAK VELOCITY: 1 CM2/M2
ECHO AV PEAK GRADIENT: 6.79 MMHG
ECHO AV PEAK VELOCITY: 130.33 CM/S
ECHO LA AREA 4C: 14.43 CM2
ECHO LA MAJOR AXIS: 2.7 CM
ECHO LA MINOR AXIS: 1.14 CM
ECHO LA VOL 2C: 52.08 ML (ref 22–52)
ECHO LA VOL 4C: 29.49 ML (ref 22–52)
ECHO LA VOL BP: 41.64 ML (ref 22–52)
ECHO LA VOL/BSA BIPLANE: 17.6 ML/M2 (ref 16–28)
ECHO LA VOLUME INDEX A2C: 22.01 ML/M2 (ref 16–28)
ECHO LA VOLUME INDEX A4C: 12.46 ML/M2 (ref 16–28)
ECHO LV E' LATERAL VELOCITY: 11.38 CM/S
ECHO LV E' SEPTAL VELOCITY: 8.6 CM/S
ECHO LV INTERNAL DIMENSION DIASTOLIC: 4.87 CM (ref 3.9–5.3)
ECHO LV INTERNAL DIMENSION SYSTOLIC: 2.98 CM
ECHO LV IVSD: 0.94 CM (ref 0.6–0.9)
ECHO LV MASS 2D: 162.7 G (ref 67–162)
ECHO LV MASS INDEX 2D: 68.7 G/M2 (ref 43–95)
ECHO LV POSTERIOR WALL DIASTOLIC: 0.96 CM (ref 0.6–0.9)
ECHO LVOT DIAM: 2.02 CM
ECHO LVOT PEAK GRADIENT: 4.01 MMHG
ECHO LVOT PEAK VELOCITY: 100.13 CM/S
ECHO MV A VELOCITY: 119.74 CM/S
ECHO MV AREA PHT: 3.39 CM2
ECHO MV E DECELERATION TIME (DT): 223.46 MS
ECHO MV E VELOCITY: 71.24 CM/S
ECHO MV E/A RATIO: 0.59
ECHO MV E/E' LATERAL: 6.26
ECHO MV E/E' RATIO (AVERAGED): 7.27
ECHO MV E/E' SEPTAL: 8.28
ECHO MV PRESSURE HALF TIME (PHT): 64.8 MS
ECHO PV MAX VELOCITY: 105.95 CM/S
ECHO PV PEAK INSTANTANEOUS GRADIENT SYSTOLIC: 4.49 MMHG
ECHO RV INTERNAL DIMENSION: 3.37 CM
ECHO RV TAPSE: 1.68 CM (ref 1.5–2)
MAGNESIUM SERPL-MCNC: 2.1 MG/DL (ref 1.6–2.4)
P-R INTERVAL, ECG05: 174 MS
PHOSPHATE SERPL-MCNC: 3.4 MG/DL (ref 2.6–4.7)
Q-T INTERVAL, ECG07: 364 MS
QRS DURATION, ECG06: 88 MS
QTC CALCULATION (BEZET), ECG08: 419 MS
TROPONIN I SERPL-MCNC: <0.05 NG/ML
VENTRICULAR RATE, ECG03: 80 BPM

## 2021-01-15 PROCEDURE — 84484 ASSAY OF TROPONIN QUANT: CPT

## 2021-01-15 PROCEDURE — 70450 CT HEAD/BRAIN W/O DYE: CPT

## 2021-01-15 PROCEDURE — 65270000029 HC RM PRIVATE

## 2021-01-15 PROCEDURE — 74011250636 HC RX REV CODE- 250/636: Performed by: NURSE PRACTITIONER

## 2021-01-15 PROCEDURE — 37195 THROMBOLYTIC THERAPY STROKE: CPT

## 2021-01-15 PROCEDURE — 94762 N-INVAS EAR/PLS OXIMTRY CONT: CPT

## 2021-01-15 PROCEDURE — 74011000250 HC RX REV CODE- 250: Performed by: NURSE PRACTITIONER

## 2021-01-15 PROCEDURE — 74011250637 HC RX REV CODE- 250/637: Performed by: NURSE PRACTITIONER

## 2021-01-15 PROCEDURE — 84100 ASSAY OF PHOSPHORUS: CPT

## 2021-01-15 PROCEDURE — 99223 1ST HOSP IP/OBS HIGH 75: CPT | Performed by: PSYCHIATRY & NEUROLOGY

## 2021-01-15 PROCEDURE — 36415 COLL VENOUS BLD VENIPUNCTURE: CPT

## 2021-01-15 PROCEDURE — 93306 TTE W/DOPPLER COMPLETE: CPT

## 2021-01-15 PROCEDURE — 99285 EMERGENCY DEPT VISIT HI MDM: CPT

## 2021-01-15 PROCEDURE — 83880 ASSAY OF NATRIURETIC PEPTIDE: CPT

## 2021-01-15 PROCEDURE — 70551 MRI BRAIN STEM W/O DYE: CPT

## 2021-01-15 PROCEDURE — 83735 ASSAY OF MAGNESIUM: CPT

## 2021-01-15 PROCEDURE — 92610 EVALUATE SWALLOWING FUNCTION: CPT | Performed by: SPEECH-LANGUAGE PATHOLOGIST

## 2021-01-15 RX ORDER — LORAZEPAM 2 MG/ML
1 INJECTION INTRAMUSCULAR
Status: COMPLETED | OUTPATIENT
Start: 2021-01-15 | End: 2021-01-15

## 2021-01-15 RX ORDER — METOCLOPRAMIDE HYDROCHLORIDE 5 MG/ML
5 INJECTION INTRAMUSCULAR; INTRAVENOUS
Status: DISCONTINUED | OUTPATIENT
Start: 2021-01-15 | End: 2021-01-17 | Stop reason: HOSPADM

## 2021-01-15 RX ORDER — LABETALOL HYDROCHLORIDE 5 MG/ML
10 INJECTION, SOLUTION INTRAVENOUS
Status: DISCONTINUED | OUTPATIENT
Start: 2021-01-15 | End: 2021-01-17 | Stop reason: HOSPADM

## 2021-01-15 RX ORDER — SODIUM CHLORIDE 0.9 % (FLUSH) 0.9 %
5-40 SYRINGE (ML) INJECTION AS NEEDED
Status: DISCONTINUED | OUTPATIENT
Start: 2021-01-15 | End: 2021-01-17 | Stop reason: HOSPADM

## 2021-01-15 RX ORDER — POLYETHYLENE GLYCOL 3350 17 G/17G
17 POWDER, FOR SOLUTION ORAL DAILY PRN
Status: DISCONTINUED | OUTPATIENT
Start: 2021-01-15 | End: 2021-01-17 | Stop reason: HOSPADM

## 2021-01-15 RX ORDER — ACETAMINOPHEN 325 MG/1
650 TABLET ORAL
Status: DISCONTINUED | OUTPATIENT
Start: 2021-01-15 | End: 2021-01-17 | Stop reason: HOSPADM

## 2021-01-15 RX ORDER — SODIUM CHLORIDE 0.9 % (FLUSH) 0.9 %
5-40 SYRINGE (ML) INJECTION EVERY 8 HOURS
Status: DISCONTINUED | OUTPATIENT
Start: 2021-01-15 | End: 2021-01-17 | Stop reason: HOSPADM

## 2021-01-15 RX ORDER — ACETAMINOPHEN 650 MG/1
650 SUPPOSITORY RECTAL
Status: DISCONTINUED | OUTPATIENT
Start: 2021-01-15 | End: 2021-01-17 | Stop reason: HOSPADM

## 2021-01-15 RX ORDER — PROMETHAZINE HYDROCHLORIDE 25 MG/1
12.5 TABLET ORAL
Status: DISCONTINUED | OUTPATIENT
Start: 2021-01-15 | End: 2021-01-15

## 2021-01-15 RX ORDER — ONDANSETRON 2 MG/ML
4 INJECTION INTRAMUSCULAR; INTRAVENOUS
Status: DISCONTINUED | OUTPATIENT
Start: 2021-01-15 | End: 2021-01-17 | Stop reason: HOSPADM

## 2021-01-15 RX ADMIN — METOCLOPRAMIDE 5 MG: 5 INJECTION, SOLUTION INTRAMUSCULAR; INTRAVENOUS at 20:59

## 2021-01-15 RX ADMIN — LORAZEPAM 1 MG: 2 INJECTION INTRAMUSCULAR; INTRAVENOUS at 12:14

## 2021-01-15 RX ADMIN — ACETAMINOPHEN 650 MG: 325 TABLET ORAL at 20:59

## 2021-01-15 RX ADMIN — Medication 10 ML: at 16:28

## 2021-01-15 RX ADMIN — ACETAMINOPHEN 650 MG: 325 TABLET ORAL at 16:28

## 2021-01-15 RX ADMIN — FAMOTIDINE 20 MG: 10 INJECTION, SOLUTION INTRAVENOUS at 20:59

## 2021-01-15 RX ADMIN — Medication 10 ML: at 21:04

## 2021-01-15 RX ADMIN — FAMOTIDINE 20 MG: 10 INJECTION, SOLUTION INTRAVENOUS at 07:23

## 2021-01-15 RX ADMIN — Medication 10 ML: at 07:22

## 2021-01-15 RX ADMIN — METOCLOPRAMIDE 5 MG: 5 INJECTION, SOLUTION INTRAMUSCULAR; INTRAVENOUS at 03:53

## 2021-01-15 NOTE — ED PROVIDER NOTES
62-year-old female with a history of of migraine headaches but no history of prior complicated migraines, ADHD, OA, presents to the emergency department stating that around 8:30 PM she developed a headache that was 4 out of 10 in severity but also developed confusion, right-sided facial numbness, weakness in her right upper and lower extremity, slurred speech, and difficulty getting words out which is new for her. She denies any falls or head trauma. On no blood thinners, has not taken anything for her symptoms. On arrival history is limited due to obvious slurred speech and difficulty providing history with concern for new neurologic deficit. Code S was called on arrival.  Blood glucose was 90.            Past Medical History:   Diagnosis Date    ADHD     History of fusion of spine for scoliosis     Migraines     Osteoarthritis     lower spine       Past Surgical History:   Procedure Laterality Date    HX  SECTION      x1    HX CHOLECYSTECTOMY      HX LAP GASTRIC BYPASS  2008    HX OTHER SURGICAL      double spinal fusion    HX OTHER SURGICAL  2016    lap band removal    HX TONSILLECTOMY      HX WISDOM TEETH EXTRACTION      IR INJ FACET LUMBAR / SACRAL SINGLE  2019         Family History:   Problem Relation Age of Onset    Other Mother         cerebral palsy    Osteoporosis Mother     Heart Disease Mother         [place] Area valve\"   [facility] Migraines Mother     Hypertension Father     COPD Father     Macular Degen Father     Lung Cancer Maternal Grandmother     Thyroid Disease Maternal Grandmother         hashimoto    Stroke Maternal Grandfather     Cancer Paternal Grandmother         leukemia    Thyroid Disease Maternal Aunt         hashimoto    Heart Attack Paternal Aunt        Social History     Socioeconomic History    Marital status:      Spouse name: Not on file    Number of children: Not on file    Years of education: Not on file    Highest education level: Not on file   Occupational History    Not on file   Social Needs    Financial resource strain: Not on file    Food insecurity     Worry: Not on file     Inability: Not on file    Transportation needs     Medical: Not on file     Non-medical: Not on file   Tobacco Use    Smoking status: Never Smoker    Smokeless tobacco: Never Used   Substance and Sexual Activity    Alcohol use: No     Frequency: Never    Drug use: No    Sexual activity: Yes     Partners: Male     Birth control/protection: Surgical     Comment: coils    Lifestyle    Physical activity     Days per week: Not on file     Minutes per session: Not on file    Stress: Not on file   Relationships    Social connections     Talks on phone: Not on file     Gets together: Not on file     Attends Baptism service: Not on file     Active member of club or organization: Not on file     Attends meetings of clubs or organizations: Not on file     Relationship status: Not on file    Intimate partner violence     Fear of current or ex partner: Not on file     Emotionally abused: Not on file     Physically abused: Not on file     Forced sexual activity: Not on file   Other Topics Concern    Not on file   Social History Narrative    Not on file         ALLERGIES: Pcn [penicillins]    Review of Systems   Constitutional: Positive for activity change. Negative for appetite change, chills and fever. HENT: Negative for congestion, rhinorrhea, sinus pressure, sneezing and sore throat. Eyes: Negative for photophobia and visual disturbance. Respiratory: Negative for cough and shortness of breath. Cardiovascular: Negative for chest pain. Gastrointestinal: Negative for abdominal pain, blood in stool, constipation, diarrhea, nausea and vomiting. Genitourinary: Negative for difficulty urinating, dysuria, flank pain, frequency, hematuria, menstrual problem, urgency, vaginal bleeding and vaginal discharge.    Musculoskeletal: Negative for arthralgias, back pain, myalgias and neck pain. Skin: Negative for rash and wound. Neurological: Positive for facial asymmetry, speech difficulty, weakness, numbness and headaches. Negative for syncope. Psychiatric/Behavioral: Positive for confusion. Negative for self-injury and suicidal ideas. All other systems reviewed and are negative. Vitals:    01/14/21 2217 01/14/21 2227 01/14/21 2230 01/14/21 2242   BP: (!) 135/95  (!) 135/95 124/84   Pulse: 90 87  97   Resp: 17 17  30   Temp:       SpO2:  96%  98%   Weight:       Height:                Physical Exam  Vitals signs and nursing note reviewed. Constitutional:       General: She is not in acute distress. Appearance: Normal appearance. She is well-developed. She is obese. She is not diaphoretic. HENT:      Head: Normocephalic and atraumatic. Nose: Nose normal.   Eyes:      Extraocular Movements: Extraocular movements intact. Conjunctiva/sclera: Conjunctivae normal.      Pupils: Pupils are equal, round, and reactive to light. Neck:      Musculoskeletal: Neck supple. Cardiovascular:      Rate and Rhythm: Normal rate and regular rhythm. Heart sounds: Normal heart sounds. Pulmonary:      Effort: Pulmonary effort is normal.      Breath sounds: Normal breath sounds. Abdominal:      General: There is no distension. Palpations: Abdomen is soft. Tenderness: There is no abdominal tenderness. Musculoskeletal:         General: No tenderness. Skin:     General: Skin is warm and dry. Neurological:      Mental Status: She is alert and oriented to person, place, and time. GCS: GCS eye subscore is 4. GCS verbal subscore is 5. GCS motor subscore is 6. Cranial Nerves: Cranial nerve deficit present. Sensory: Sensory deficit present. Motor: Weakness present. Coordination: Coordination abnormal.      Comments: Mild right-sided facial droop, 4/5 strength in right upper and lower extremities.   5/5 strength in left hemibody. Decreased sensation to the right side of the face but otherwise seems to have intact sensation throughout. Markedly slurred speech with Broca's style aphasia. Positive pronator drift on right. MDM   44-year-old female presents with headache and slurred speech and right-sided weakness concern for acute CVA versus complicated migraine. No reported history of complicated migraines. Code S called on arrival.    Initial BP elevated at 168/105, otherwise afebrile with vital signs stable. Labs returned showing WBC 13.8, otherwise reassuring. CT head WO negative. Procedures  2149 EKG shows normal sinus rhythm with a rate of 80 bpm with no acute ST or T wave abnormalities suggestive of ischemia.    9:50pm seema case with teleguy, Dr. Oracio England, who recommended mixing TPA and will see patient at the bedside. Rec'd giving it after decreasing BP. She was given 20mg labetalol, and cardene gtt was ordered to further assist with BP reduction if needed. Also given migraine cocktail medications including compazine, benadryl and decadron. 10:40pm discussed case with Prince Rhett NP in intensivist service working with Dr. Kathy Atkins, who accepted the patient to the ICU. Total critical care time spent exclusive of procedures:  60 minutes    Perfect Serve Consult for Admission  10:57 PM    ED Room Number: SER04/04  Patient Name and age:  Adore Freeman 39 y.o.  female  Working Diagnosis:   1. Acute right-sided weakness    2. Dysphasia    3. Aphasia    4. Complicated migraine    5. Acute nonintractable headache, unspecified headache type    6. Hypertensive emergency    7.  Acute CVA (cerebrovascular accident) ([address]        COVID-19 Suspicion:  no  Sepsis present:  no  Reassessment needed: no  Code Status:  Full Code  Readmission: no  Isolation Requirements:  no  Recommended Level of Care:  ICU  Department:[facility] - [phone]  Other: Onset of headache, 4/10, and dysphagia and Broca style aphasia with right sided weakness at 8:30 PM.  Significantly hypertensive on arrival.  CT head WO neg. getting TPA as well as IV BP meds and migraine cocktail. CVA versus complicated migraine versus hypertensive emergency, accepted in the ICU but told there were no beds. 10:55pm discussed case with Dr. Leo Fu, ED attending at [facility] who accepted the patient to the ED awaiting an ICU bed.

## 2021-01-15 NOTE — PROGRESS NOTES
Admission Medication Reconciliation:    Information obtained from:  Patient   RxQuery data available¹:  NO    Comments/Recommendations: Updated PTA meds/reviewed patient's allergies. 1)  Patient states that she currently does not take any medications at home    2)  Medication changes (since last review): Added  - None    Adjusted  - None    Removed  - Adderall  - Maxalt  - MVI     ¹RxQuery pharmacy benefit data reflects medications filled and processed through the patient's insurance, however   this data does NOT capture whether the medication was picked up or is currently being taken by the patient. Allergies:  Pcn [penicillins]    Significant PMH/Disease States:   Past Medical History:   Diagnosis Date    ADHD     History of fusion of spine for scoliosis     Migraines     Osteoarthritis     lower spine     Chief Complaint for this Admission:    Chief Complaint   Patient presents with    Migraine    Dysarthria     Prior to Admission Medications:   None     Please contact the main inpatient pharmacy with any questions or concerns at [phone] and we will direct you to the clinical pharmacist covering this patient's care while in-house.    Viviane Cedillo, CAD

## 2021-01-15 NOTE — ACP (ADVANCE CARE PLANNING)
Request by ER care manager for AMD request.  Roseann Paiz arrived to ER and consulted with nursing staff. Visited Ms. Ronda Victor who indicated her desire to complete AMD.  Pt named her , Susanne Ambrose  [phone] as primary agent, and her father, Ankita Carbone [phone] as secondary agent. Pt completed part of Living Will Section. After form was signed and witnessed, pt was admitted to 6th floor.  made copies of AMD, returing original and 2 copies to patient, and placing copy on pt's 6th floor chart.     Roseann Saha

## 2021-01-15 NOTE — PROGRESS NOTES
Physical Therapy - defer  Order received, chart reviewed. Noted TPA given 1/14/2021 @ 2230. Per protocol, will hold therapy activity for 24 hours and follow up tomorrow for evaluation. Thank you.

## 2021-01-15 NOTE — ED NOTES
Cardene held due to BP WDL    BP and MRI discussed with Neuro NP, per NP cardene can be started as needed for HTN as defined in orders.

## 2021-01-15 NOTE — PROGRESS NOTES
Care Management:    Transition of Care Plan:     · RUR: 7%  · Disposition: home  · Transportation: friend or   · DX: CVA (received tPA)      Met with patient in the room. Patient confirmed her address, phone number, and CM will update emergency contacts. Patient lives with her daughter in a multi-level home with steps to enter. Patient states her symptoms have resolved and she does not feel that she will need any additional assistance at discharge. DME: none  ADLs: independent  Previous HH/SNF/rehab: Outpatient therapy  Insurance: [facility] [place]  ER Contacts:    Donna Rice [phone]  Father Lisa Pike [phone]  Lenora Li [phone]      Reason for Admission:   Aphasia, CVA, received tPA 1-14-21 at 22:30                   RUR Score:       7%              Plan for utilizing home health:    Pending therapy consults      PCP: First and Last name:  Marc Walls NP   Name of Practice: [facility]   Are you a current patient: Yes/No: yes   Approximate date of last visit: 9-10-20   Can you participate in a virtual visit with your PCP: yes                    Current Advanced Directive/Advance Care Plan: None on file. Left voicemail for pastoral care to request AMD be completed. Care Management Interventions  PCP Verified by CM: Yes(Rina Leos NP)  Last Visit to PCP: 09/10/20  Palliative Care Criteria Met (RRAT>21 & CHF Dx)?: No  Mode of Transport at Discharge:  Other (see comment)  Transition of Care Consult (CM Consult): Discharge Planning  Discharge Durable Medical Equipment: No  Physical Therapy Consult: Yes  Occupational Therapy Consult: Yes  Speech Therapy Consult: Yes  Current Support Network: Lives with Spouse(Lives with spouse and 12year old daughter.)  Confirm Follow Up Transport: [address] Provided?: No  Discharge Location  Discharge Placement: [facility]

## 2021-01-15 NOTE — PROGRESS NOTES
SPEECH PATHOLOGY BEDSIDE SWALLOW EVALUATION/DISCHARGE  Patient: Lashell Rosenberg (17 y.o. female)  Date: 1/15/2021  Primary Diagnosis: Aphasia [R47.01]       Precautions: fall       ASSESSMENT :  Based on the objective data described below, the patient presents with no oral or pharyngeal dysphagia with timely and complete mastication, timely swallow initiation and no s/s of aspiration observed. Patient reports her speech is back to baseline and she is able to carry on a fluent and appropriate conversation without any aphasia appreciated. Skilled acute therapy provided by a speech-language pathologist is not indicated at this time. PLAN :  Recommendations:  --recommend regular diet. No further SLP needs   Discharge Recommendations: None     SUBJECTIVE:   Patient alert, cooperative. OBJECTIVE:     Past Medical History:   Diagnosis Date    ADHD     History of fusion of spine for scoliosis     Migraines     Osteoarthritis     lower spine     Past Surgical History:   Procedure Laterality Date    HX  SECTION      x1    HX CHOLECYSTECTOMY  2011    HX LAP GASTRIC BYPASS  2008    HX OTHER SURGICAL      double spinal fusion    HX OTHER SURGICAL  2016    lap band removal    HX TONSILLECTOMY      HX WISDOM TEETH EXTRACTION      IR INJ FACET LUMBAR / SACRAL SINGLE  2019     Prior Level of Function/Home Situation:      Diet prior to admission: regular  Current Diet:  NPO    Cognitive and Communication Status:  Neurologic State: Alert, Appropriate for age  Orientation Level: Oriented X4  Cognition: Appropriate decision making, Appropriate for age attention/concentration, Appropriate safety awareness  Perception: Appears intact  Perseveration: No perseveration noted  Safety/Judgement: Awareness of environment  Oral Assessment:  Oral Assessment  Labial: No impairment  Dentition: Natural  Oral Hygiene: moist mucosa   Lingual: No impairment  Mandible: No impairment  P.O.  Trials:  Patient Position: upright in bed   Vocal quality prior to P.O.: No impairment  Consistency Presented: Puree; Solid; Thin liquid  How Presented: Self-fed/presented;Cup/sip;Cup/gulp; Spoon;Straw;Successive swallows     Bolus Acceptance: No impairment  Bolus Formation/Control: No impairment     Propulsion: No impairment  Oral Residue: None  Initiation of Swallow: No impairment  Laryngeal Elevation: Functional  Aspiration Signs/Symptoms: None  Pharyngeal Phase Characteristics: No impairment, issues, or problems              Oral Phase Severity: No impairment  Pharyngeal Phase Severity : No impairment  NOMS:   The NOMS functional outcome measure was used to quantify this patient's level of swallowing impairment. Based on the NOMS, the patient was determined to be at level 7 for swallow function     NOMS Swallowing Levels:  Level 1 (CN): NPO  Level 2 (CM): NPO but takes consistency in therapy  Level 3 (CL): Takes less than 50% of nutrition p.o. and continues with nonoral feedings; and/or safe with mod cues; and/or max diet restriction  Level 4 (CK): Safe swallow but needs mod cues; and/or mod diet restriction; and/or still requires some nonoral feeding/supplements  Level 5 (CJ): Safe swallow with min diet restriction; and/or needs min cues  Level 6 (CI): Independent with p.o.; rare cues; usually self cues; may need to avoid some foods or needs extra time  Level 7 ([address] [place] [address]): Independent for all p.o.  NEELAM. (2003). National Outcomes Measurement System (NOMS): Adult Speech-Language Pathology User's Guide. Pain:  Pain Scale 1: Numeric (0 - 10)  Pain Intensity 1: 2  Pain Location 1: Head  After treatment:   Patient left in no apparent distress in bed, Call bell within reach and Nursing notified    COMMUNICATION/EDUCATION:   Patient was educated regarding her functional swallow as this relates to her diagnosis of CVA workup. She demonstrated Excellent understanding as evidenced by verbalization of understanding.     The patient's plan of care including recommendations, planned interventions, and recommended diet changes were discussed with: Registered nurse. Thank you for this referral.  El Thompson M.CD.  CCC-SLP   Time Calculation: 10 mins

## 2021-01-15 NOTE — ED NOTES
Pt has agreed to attempt MRI with help of anti anxiety medication; MRI tech contacted who adv test could not be performed until 10:45 but would call if that changes.

## 2021-01-15 NOTE — ED NOTES
Reports confusion, slurred speech beginning at 2030 tonight. Pt able to ambulate to room with steady gait. Patient has hx of migraines.     Code S level 1 called, CT aware

## 2021-01-15 NOTE — H&P
[facility]    ICU TEAM History and Physical    Name: Juan R Deal   : 1975   MRN: [id number]   Date: 1/15/2021      Assessment:  Pt has hx migraines presented with RUE weakness and aphasia, CT head wo was negative for acute process was deemed appropriate for TPA per neurology. ICU consulted for post TPA management      ICU Problems:  1. Complicated migraine versus acute CVA sp TPA  2. Acute right sided weakness   3. Dysphasia   4. Aphasia   5. Obesity   6. Hypertension       Plan:     Neuro:   -Q1 H neurochecks   -Neurology consulted   - Acute right sided weakness: PT/OT   - Speech consulted- Dysphasia/Aphasia  - MRI ordered per neurology   -Improving, CTM     Pulm:   - On RA   -Supplemental oxygen as needed to keep Sats >92  - Continue bronchodilators, pulmonary hygiene care, Aspiration precautions  -  Duo-Nebs Head of bed > 30 degrees  Incentive spirometry  Cardiac:   -SBP management, keep SBP <180  -Cardene drip as needed    Heme:  <7 g/dL    MSK:  PT/OT/Speech consulted       Discussed Plan of Care/Code Status: Full Code  Appreciate Consultants Input Neurology   Discussed Care Plan with Bedside RN  Documentation of Current Medications  Rest of Plan Below:    F - Feeding:  Pending Swallow eval   A - Analgesia: Acetaminophen  S - Sedation: None  T - DVT Prophylaxis: SCD's or Sequential Compression Device   H - Head of Bed: > 30 Degrees  U - Ulcer Prophylaxis: Protonix (pantoprazole)   G - Glycemic Control: Insulin  S - Spontaneous Breathing Trial: N/A  B - Bowel Regimen: MiraLax  I - Indwelling Catheter:   Tubes: None  Lines: Peripheral IV  Drains: None      Subjective:   Progress Note: 1/15/2021      Reason for ICU Admission: S/p TPA     HPI: Pt is a 38 yo F with a PMH of ADHD, migraines, OA, obesity who presented to [facility] ER with co acute onset right upper extremiy weakness, HA, and word finding difficulty.  Neurology was consulted and workup included a CT head wo which was negative for any acute findings, TPA was administered per neurology. Pt was then transferred to [facility] for further recommendations in management. On exam, pt is awake and alrt X4, she is having some word finding difficulty with some mild  Right sided facial numbness and RUE weakness of 4/5. Pt also reports that her HA has improved. Pt denies any current visual changes, sensory changes, or weakness. Overnight Events:   1/15/2021      POD:  * No surgery found *    S/P:       Active Problem List:     Problem List  Date Reviewed: 2020          Codes Class    Obesity, morbid ([facility] [address] ICD-10-CM: E66.01  ICD-9-CM: 278.01         Migraine with aura and without status migrainosus, not intractable ICD-10-CM: G43.109  ICD-9-CM: 346.00         ADHD ICD-10-CM: F90.9  ICD-9-CM: 314.01         History of fusion of spine for scoliosis ICD-10-CM: Z98.1, Z87.39  ICD-9-CM: V45.4, V13.59               Past Medical History:      has a past medical history of ADHD, History of fusion of spine for scoliosis, Migraines, and Osteoarthritis. Past Surgical History:      has a past surgical history that includes hx other surgical; hx  section; hx tonsillectomy; hx lap gastric bypass (); hx other surgical (); hx cholecystectomy (); hx wisdom teeth extraction; and ir inj facet lumbar / sacral single (2019). Home Medications:     Prior to Admission medications    Medication Sig Start Date End Date Taking? Authorizing Provider   escitalopram oxalate (LEXAPRO) 5 mg tablet Take 1 Tab by mouth daily. 19   Jaycee Garcia NP   ibuprofen (MOTRIN) 200 mg tablet Take  by mouth every six (6) hours as needed for Pain. Provider, Historical   cyclobenzaprine (FLEXERIL) 10 mg tablet Take 1 Tab by mouth three (3) times daily as needed for Muscle Spasm(s). 3/58/24   Niraj Richard, NP   acetaminophen (TYLENOL) 500 mg tablet Take 2 Tabs by mouth every six (6) hours as needed for Pain.  7/10/19   Ivy Barreto MD dextroamphetamine-amphetamine (ADDERALL) 20 mg tablet Take 20 mg by mouth two (2) times a day. Provider, Historical   rizatriptan (MAXALT) 10 mg tablet Take 10 mg by mouth once as needed for Migraine. May repeat in 2 hours if needed    Provider, Historical   multivitamin (ONE A DAY) tablet Take 1 Tab by mouth daily. Provider, Historical       Allergies/Social/Family History: Allergies   Allergen Reactions    Pcn [Penicillins] Rash     Facial rash      Social History     Tobacco Use    Smoking status: Never Smoker    Smokeless tobacco: Never Used   Substance Use Topics    Alcohol use: No     Frequency: Never      Family History   Problem Relation Age of Onset    Other Mother         cerebral palsy    Osteoporosis Mother     Heart Disease Mother         Omelia Houston valve\"   [employer] Migraines Mother     Hypertension Father     COPD Father     Macular Degen Father     Lung Cancer Maternal Grandmother     Thyroid Disease Maternal Grandmother         hashimoto    Stroke Maternal Grandfather     Cancer Paternal Grandmother         leukemia    Thyroid Disease Maternal Aunt         hashimoto    Heart Attack Paternal Aunt        Review of Systems:     A comprehensive review of systems was negative except for that written in the HPI. Objective:   Vital Signs:  Visit Vitals  /77   Pulse 88   Temp 98 °F (36.7 °C)   Resp 13   Ht 5' 6\" (1.676 m)   Wt 135.1 kg (297 lb 13.5 oz)   LMP 2020   SpO2 97%   BMI 48.07 kg/m²      O2 Device: Room air Temp (24hrs), Av.2 °F (36.8 °C), Min:98 °F (36.7 °C), Max:98.3 °F (36.8 °C)           Intake/Output:   No intake or output data in the 24 hours ending 01/15/21 0215    Physical Exam:    General:  Alert, cooperative, well noursished, well developed, appears stated age   Eyes:  Sclera anicteric. Pupils equally round and reactive to light.    Mouth/Throat: Mucous membranes normal, oral pharynx clear   Neck: Supple   Lungs:   Clear to auscultation bilaterally, good effort   CV:  Regular rate and rhythm,no murmur, click, rub or gallop   Abdomen:   Soft, non-tender. bowel sounds normal. non-distended   Extremities: No cyanosis or edema   Skin: Skin color, texture, turgor normal. no acute rash or lesions   Lymph nodes: Cervical and supraclavicular normal   Musculoskeletal: No swelling or deformity   Lines/Devices:  Intact, no erythema, drainage or tenderness   Psych: Alert and oriented, normal mood affect given the setting  Neuro - Alert and oriented to person, place and time. FLAKO SCRUGGS 4/5 some word finding difficulty. LABS AND  DATA: Personally reviewed  Recent Labs     01/14/21 2149   WBC 13.8*   HGB 14.2   HCT 42.8        Recent Labs     01/14/21 2149      K 3.7      CO2 22   BUN 21*   CREA 0.64   GLU 95   CA 8.8     Recent Labs     01/14/21 2149   AP 58   TP 7.3   ALB 3.6   GLOB 3.7     Recent Labs     01/14/21 2149   INR 1.0   PTP 9.9      No results for input(s): PHI, PCO2I, PO2I, FIO2I in the last 72 hours. Recent Labs     01/14/21 2149   TROIQ <0.05       Hemodynamics:   PAP:   CO:     Wedge:   CI:     CVP:    SVR:       PVR:       Ventilator Settings:  Mode Rate Tidal Volume Pressure FiO2 PEEP                    Peak airway pressure:      Minute ventilation:          MEDS: Reviewed    Chest X-Ray:  CXR Results  (Last 48 hours)    None          Multidisciplinary Rounds Completed:  No    ABCDEF Bundle/Checklist Completed:  Yes    SPECIAL EQUIPMENT  None    DISPOSITION  Stay in ICU    CRITICAL CARE CONSULTANT NOTE  I had a face to face encounter with the patient, reviewed and interpreted patient data including clinical events, labs, images, vital signs, I/O's, and examined patient.   I have discussed the case and the plan and management of the patient's care with the consulting services, the bedside nurses and the respiratory therapist.      NOTE OF PERSONAL INVOLVEMENT IN CARE   This patient has a high probability of imminent, clinically significant deterioration, which requires the highest level of preparedness to intervene urgently. I participated in the decision-making and personally managed or directed the management of the following life and organ supporting interventions that required my frequent assessment to treat or prevent imminent deterioration. I personally spent 90 minutes of critical care time. This is time spent at this critically ill patient's bedside actively involved in patient care as well as the coordination of care and discussions with the patient's family. This does not include any procedural time which has been billed separately.     Emma Sampson NP      [facility]

## 2021-01-15 NOTE — ED NOTES
TRANSFER - OUT REPORT:    Verbal report given to Bruna Garcia RN(name) on Jacqueline Tidwell  being transferred to [facility] ED(unit) for routine progression of care       Report consisted of patients Situation, Background, Assessment and   Recommendations(SBAR). Information from the following report(s) SBAR, ED Summary, MAR, Recent Results and Cardiac Rhythm NSR was reviewed with the receiving nurse. Lines:   Peripheral IV 01/14/21 Right Forearm (Active)   Site Assessment Clean, dry, & intact 01/14/21 2136   Dressing Status Clean, dry, & intact 01/14/21 2136        Opportunity for questions and clarification was provided.       Patient transported with:   Monitor, tpa

## 2021-01-15 NOTE — PROGRESS NOTES
[facility] Adult  Hospitalist Group     ICU Transfer/Accept Summary     This patient is being transferred [place] [address] ICU  DATE OF TRANSFER: 1/15/2021       PATIENT ID: Wilmer Forman  MRN: [id number]   YOB: 1975    PRIMARY CARE PROVIDER: Shannon Kincaid NP   DATE OF ADMISSION: 1/14/2021  9:23 PM    ATTENDING PHYSICIAN: Gaby Torres MD  CONSULTATIONS:   IP CONSULT TO INTENSIVIST  IP CONSULT TO NEUROLOGY    PROCEDURES/SURGERIES:   * No surgery found *    REASON FOR ADMISSION: <principal problem not specified>     HOSPITAL PROBLEM LIST:  Patient Active Problem List   Diagnosis Code    Obesity, morbid ([facility] [address] E66.01    Migraine with aura and without status migrainosus, not intractable G43. 109    ADHD F90.9    History of fusion of spine for scoliosis Z98.1, Z87.39    Aphasia R47.01         Brief HPI and Hospital Course:    Reason for ICU Admission: S/p TPA      HPI: Pt is a 40 yo F with a PMH of ADHD, migraines, OA, obesity who presented to [facility] ER with co acute onset right upper extremiy weakness, HA, and word finding difficulty. Neurology was consulted and workup included a CT head wo which was negative for any acute findings, TPA was administered per neurology. Pt was then transferred to [facility] [place] for further recommendations in management. On exam, pt is awake and alrt X4, she is having some word finding difficulty with some mild  Right sided facial numbness and RUE weakness of 4/5. Pt also reports that her HA has improved. Pt denies any current visual changes, sensory changes, or weakness.      Overnight Events:   1/15/2021  Patient is feeling okay. Mild headache but overall much better. No trouble speaking. No focal weakness or tingling or numbness. No blurring of vision. Awaiting for MRI. Overall feeling stable and better. Tolerated TPA well.   No other concerns or questions.       Assessment and Plan:    #Complicated migraine versus acute CVA s/p TPA given 1/14/2021 10:30 PM  #Acute right-sided weakness, s/p TPA as above  #Dysphagia, better  #Dysarthria/slurred speech, better post TPA  #Obesity  #Hypertension    -Admitted to ICU post TPA for monitoring, now transferred to [facility]  -Every 2every 4 hour neurochecks now   -Neurology on board, follow MRI brain, follow-up further recommendations  -MRI brain  -Repeat CT head at 24 hours post TPA  -Echocardiogram  -CTA head and neck noted, CT code stroke head noted  -PT/OT/ST  -Permissive hypertension, SBP <180  -Cardene gtt. as needed ordered per ICU protocol  -Lipid panel, A1c  -Lifestyle modifications    DVT prophylaxis: No antiplatelet/anticoagulation for 24 hours post TPA. Further per neurology  CODE STATUS: Full code  DC disposition: Based on PT/OT recommendations and MRI result  Anticipated DC: 24-48 hours           PHYSICAL EXAMINATION:  Visit Vitals  BP (!) 135/90   Pulse 89   Temp 98.6 °F (37 °C)   Resp 14   Ht 5' 6\" (1.676 m)   Wt 134.7 kg (297 lb)   LMP 12/24/2020   SpO2 95%   BMI 47.94 kg/m²       General:          Alert, cooperative, no distress  HEENT:           Atraumatic, MMM            Neck:               Supple, symmetrical,  thyroid: non tender  Lungs:             Clear to auscultation bilaterally. No Wheezing or Rhonchi. No rales. Heart:              Regular  rhythm,  No  murmur   No edema  Abdomen:       Soft, non-tender. Not distended. Bowel sounds normal  Extremities:     No cyanosis. No clubbing,  +2 distal pulses  Skin:                Not pale. Not Jaundiced  No rashes   Psych:             Not anxious or agitated. Neurologic:      Alert, moves all extremities, oriented X 3.      Labs:     Recent Labs     01/14/21 2149   WBC 13.8*   HGB 14.2   HCT 42.8        Recent Labs     01/15/21  0347 01/14/21 2149   NA  --  139   K  --  3.7   CL  --  103   CO2  --  22   BUN  --  21*   CREA  --  0.64   GLU  --  95   CA  --  8.8   MG 2.1  --    PHOS 3.4  --      Recent Labs     01/14/21 2149   ALT 34   AP 58 TBILI 0.3   TP 7.3   ALB 3.6   GLOB 3.7     Recent Labs     01/14/21 2149   INR 1.0   PTP 9.9      No results for input(s): FE, TIBC, PSAT, FERR in the last 72 hours. No results found for: FOL, RBCF   No results for input(s): PH, PCO2, PO2 in the last 72 hours.   Recent Labs     01/15/21  0347 01/14/21 2149   TROIQ <0.05 <0.05     No results found for: CHOL, CHOLX, CHLST, CHOLV, HDL, HDLP, LDL, LDLC, DLDLP, TGLX, TRIGL, TRIGP, CHHD, CHHDX  Lab Results   Component Value Date/Time    Glucose (POC) 90 01/14/2021 09:31 PM     No results found for: COLOR, APPRN, SPGRU, REFSG, ZACKARY, PROTU, GLUCU, KETU, BILU, UROU, JONNA, LEUKU, GLUKE, EPSU, BACTU, WBCU, RBCU, CASTS, UCRY      CODE STATUS:  x Full Code    DNR    Partial    Comfort Care       Signed:   Roro Barboza MD  Date of Service:  1/15/2021  9:24 AM

## 2021-01-15 NOTE — ED NOTES
Patient IV blew during contrast administration, ct scan stopped, ice placed. Patient tolerated ok. VSS remain stable. Patient has full strength of extremities at this time, speaking in full sentences, states she feels as if she is back to baseline. MD made aware, verbal order to continue tpa administration received. Additional IV access obtained.

## 2021-01-15 NOTE — ROUTINE PROCESS
Occupational Therapy [id number] -  
[phone] Order received, chart reviewed. Noted TPA given 1/14/2021 @ 2230. Per protocol, will hold therapy activity for 24 hours and follow up tomorrow for evaluation. Thank you. Kenny Ibrahim MS, OTR/L

## 2021-01-15 NOTE — ED TRIAGE NOTES
Pt arrived from [place] ED. Per EMS she received TPA at 2230. Pt c/o 4/10 pain, otherwise denies symptoms she was previously experiencing.

## 2021-01-15 NOTE — ROUTINE PROCESS
TRANSFER - OUT REPORT: 
 
Verbal report given to Efrain(name) on Nicko Sutton  being transferred to 6S(unit) for routine progression of care Report consisted of patients Situation, Background, Assessment and  
Recommendations(SBAR). Information from the following report(s) ED Summary was reviewed with the receiving nurse. Lines:  
Peripheral IV 01/14/21 Right Forearm (Active) Site Assessment Clean, dry, & intact 01/14/21 2136 Dressing Status Clean, dry, & intact 01/14/21 2136 Peripheral IV 01/14/21 Left Wrist (Active) Site Assessment Clean, dry, & intact 01/14/21 0030 Phlebitis Assessment 0 01/14/21 0030 Infiltration Assessment 0 01/14/21 0030 Dressing Status Clean, dry, & intact 01/14/21 0030 Dressing Type Tape;Transparent 01/14/21 0030 Hub Color/Line Status Pink;Patent 01/14/21 0030 Opportunity for questions and clarification was provided. Patient transported with: 
 Registered Nurse

## 2021-01-15 NOTE — ED NOTES
Pt has been advised of need for MRI; pt expresses fear and anxiety r/t claustrophobia, rods in back and coil for birth control. Ibeth Pinzon, ICU NP at bedside.

## 2021-01-15 NOTE — PROGRESS NOTES
Consult received and appreciated. Will follow today for formal swallowing evaluation. Please complete nursing dysphagia screening prior to any medication administration. Thank you. Kaylen Arndt M.CD.  CCC-SLP

## 2021-01-15 NOTE — ED NOTES
Pt returned from MRI; managed well. Pt ambulatory to bathroom with steady gait; lights dimmed at pt's request.  Pt verbalizes no complaints or requests at this time.

## 2021-01-15 NOTE — PROGRESS NOTES
Spiritual Care Assessment/Progress Note  [facility]      NAME: Monica Carrizales      MRN: [id number]  AGE: 39 y.o.  SEX: female  Spiritism Affiliation: Mae Castillo   Language: English     1/15/2021     Total Time (in minutes): 64     Spiritual Assessment begun in [address] through conversation with:         [x]Patient        [] Family    [] Friend(s)        Reason for Consult: Advance medical directive consult     Spiritual beliefs: (Please include comment if needed)     [x] Identifies with a manjula tradition:  Anabaptism, per chart       [] Supported by a manjula community:            [] Claims no spiritual orientation:           [] Seeking spiritual identity:                [] Adheres to an individual form of spirituality:           [] Not able to assess:                           Identified resources for coping:      [] Prayer                               [] Music                  [] Guided Imagery     [x] Family/friends                 [] Pet visits     [] Devotional reading                         [] Unknown     [] Other                                             Interventions offered during this visit: (See comments for more details)    Patient Interventions: Catharsis/review of pertinent events in supportive environment, Advance medical directive completed, Advance medical directive consult, End of life issues discussed           Plan of Care:     [] Support spiritual and/or cultural needs    [] Support AMD and/or advance care planning process      [] Support grieving process   [] Coordinate Rites and/or Rituals    [] Coordination with community clergy   [] No spiritual needs identified at this time   [] Detailed Plan of Care below (See Comments)  [] Make referral to Music Therapy  [] Make referral to Pet Therapy     [] Make referral to Addiction services  [] Make referral to [facility]  [] Make referral to Spiritual Care Partner  [] No future visits requested        [x] Follow up upon further referrals     Comments:   Request by ER care manager for AMD request.  [address] arrived to ER and consulted with nursing staff. Visited MsRani Simmons who indicated her desire to complete AMD.  She shared some about her medical condition and how she is feeling and coping at this time. Pt expressed to be feeling better, compared to when she first arrived to the hospital.    Assisted pt with completion of AMD.  Vince Gonzalez indicated that she had an AMD with another health system, but does not have a copy. She expressed a desire to complete another one today. Pt named her , Earle Mae  [phone] as primary agent, and her father, Yamel Isbell [phone] as secondary agent. Pt completed part of Living Will Section. After form was signed and witnessed, pt was admitted to 6th floor.  made copies of AMD, returing original and 2 copies to patient, who was on the phone when  returned.  placed copy on pt's 6th floor chart.     Crystal Carbone, [address] [place] [address]

## 2021-01-15 NOTE — CONSULTS
Neurology Consult  Clarisa Hopper NP    Patient: Lawrence Carvajal MRN: [id number]  SSN: [id number]    YOB: 1975  Age: 39 y.o. Sex: female      Chief Complaint: Headache, right-sided weakness, slurred speech, word finding difficulty, right facial numbness and confusion    Subjective:      Lawrence Carvajal is a 39 y.o. female with a past medical history of complex migraines, osteoarthritis, ADHD, obesity, with history of lap band placement which is now removed, and fusion of spine for scoliosis when she was 17years old. States that approximately 8:30 pm on 01/14/21 she began having right facial numbness and tingling. She had a headache, slurred speech and difficulty with word finding. States that she had weakness in her right arm and right leg and felt uncoordinated and confused. According to patient she states she is not taking any current medications. She has taken rizatriptan for migraines in the past. Her  brought her to the ER at [place] and a code S was called. She was taken for [facility] which showed no acute process. Her BP was 168/105 upon arrival to the ER. Her NIHSS was 5. She was seen by Teleneurology who recommended that she be administered tPA which was given at 22:30. CTA H/N was performed which showed no vascular occlusion or flow-limiting stenosis. No CTP was performed possibly due to lack of capability at outlying ER. Patient was transferred to [facility] for a higher level of care. She will be managed by the Intensivist in the ER until a bed is available upstairs. Neurology was consulted for stroke management.      Past Medical History:   Diagnosis Date    ADHD     History of fusion of spine for scoliosis     Migraines     Osteoarthritis     lower spine     Family History   Problem Relation Age of Onset    Other Mother         cerebral palsy    Osteoporosis Mother     Heart Disease Mother         Chaitanya Elm valve\"   [de-identified] Migraines Mother     Hypertension Father    Amanda Goodman COPD Father     Macular Degen Father     Lung Cancer Maternal Grandmother     Thyroid Disease Maternal Grandmother         hashimoto    Stroke Maternal Grandfather     Cancer Paternal Grandmother         leukemia    Thyroid Disease Maternal Aunt         hashimoto    Heart Attack Paternal Aunt      Social History     Tobacco Use    Smoking status: Never Smoker    Smokeless tobacco: Never Used   Substance Use Topics    Alcohol use: No     Frequency: Never      Prior to Admission Medications   Prescriptions Last Dose Informant Patient Reported? Taking?   acetaminophen (TYLENOL) 500 mg tablet   No No   Sig: Take 2 Tabs by mouth every six (6) hours as needed for Pain. cyclobenzaprine (FLEXERIL) 10 mg tablet   No No   Sig: Take 1 Tab by mouth three (3) times daily as needed for Muscle Spasm(s). dextroamphetamine-amphetamine (ADDERALL) 20 mg tablet   Yes No   Sig: Take 20 mg by mouth two (2) times a day. escitalopram oxalate (LEXAPRO) 5 mg tablet   No No   Sig: Take 1 Tab by mouth daily. ibuprofen (MOTRIN) 200 mg tablet   Yes No   Sig: Take  by mouth every six (6) hours as needed for Pain.   multivitamin (ONE A DAY) tablet   Yes No   Sig: Take 1 Tab by mouth daily. rizatriptan (MAXALT) 10 mg tablet   Yes No   Sig: Take 10 mg by mouth once as needed for Migraine. May repeat in 2 hours if needed      Facility-Administered Medications: None       Allergies   Allergen Reactions    Pcn [Penicillins] Rash     Facial rash       Review of Systems:    Denies chest pain, leg pain, nausea, vomiting, difficulty swallowing, weakness, dizziness and dyspnea. Complains of mild headache and mild numbness and tingling on right cheek.      Objective:     Vitals:    01/15/21 0045 01/15/21 0115 01/15/21 0130 01/15/21 0145   BP: (!) 142/82 130/82 127/75 123/77   Pulse: 83  87 88   Resp: 17 18 15 13   Temp:       SpO2: 97% 95% 96% 97%   Weight:       Height:          Physical Exam:  GENERAL: Calm, cooperative, NAD  SKIN: Warm, dry, color appropriate for ethnicity. Neurologic Exam:  Mental Status:  Alert and oriented x 4. Appropriate affect, mood and behavior. Language:    Normal fluency, repetition, comprehension and naming. Cranial Nerves:   Pupils 3 mm, equal, round and reactive to light. Visual fields full to confrontation. Extraocular movements intact. Facial sensation intact. Full facial strength, no asymmetry. Hearing grossly intact bilaterally. No dysarthria. Tongue protrudes to midline, palate elevates symmetrically. Shoulder shrug 5/5 bilaterally. Motor:    No pronator drift. Bulk and tone normal.      5/5 power in all extremities proximally and distally. No involuntary movements. Sensation:    Sensation intact throughout to light touch  Reflexes:     Negative Babinskis    Coordination & Gait: Normal. FTN and HTS intact with no ataxia present.     NIHSS:      1a-LOC:0    1b-Month/Age:0    1c-Open/Close Hand:0    2-Best Gaze:0    3-Visual Fields:0    4-Facial Palsy:0    5a-Left Arm:0    5b-Right Arm:0    6a-Left Le    6b-Right Le    7-Limb Ataxia:0    8-Sensory:1    9-Best Language:0    10-Dysarthria:0    11-Extinction/Inattention:0  TOTAL SCORE:1  Labs:  Lab Results   Component Value Date/Time    WBC 13.8 (H) 2021 09:49 PM    HGB 14.2 2021 09:49 PM    HCT 42.8 2021 09:49 PM    PLATELET 819  09:49 PM    MCV 88.4 2021 09:49 PM      Lab Results   Component Value Date/Time    Sodium 139 2021 09:49 PM    Potassium 3.7 2021 09:49 PM    Chloride 103 2021 09:49 PM    CO2 22 2021 09:49 PM    Anion gap 14 2021 09:49 PM    Glucose 95 2021 09:49 PM    BUN 21 (H) 2021 09:49 PM    Creatinine 0.64 2021 09:49 PM    BUN/Creatinine ratio 33 (H) 2021 09:49 PM    GFR est AA >60 2021 09:49 PM    GFR est non-AA >60 2021 09:49 PM    Calcium 8.8 2021 09:49 PM     Lab Results Component Value Date/Time    Troponin-I, Qt. <0.05 01/14/2021 09:49 PM       Imaging:    CT Results (maximum last 3): Results from [place] encounter on 01/14/21   CT HEAD WO CONT    Narrative EXAM: CT HEAD WO CONT    INDICATION: Migraine headache, dysarthria, treated with TPA. COMPARISON: CT head and CT angiography head earlier today. TECHNIQUE: Noncontrast head CT. Coronal and sagittal reformats. CT dose  reduction was achieved through the use of a standardized protocol tailored for  this examination and automatic exposure control for dose modulation. Adaptive  statistical iterative reconstruction (ASIR) was utilized. FINDINGS: The ventricles and sulci are age-appropriate without hydrocephalus. There is no mass effect or midline shift. There is no intracranial hemorrhage or  extra-axial fluid collection. There is no abnormal area of decreased density to  suggest infarct. Contrast in the vasculature is from previous imaging. The calvarium is intact. The visualized paranasal sinuses and mastoid air cells  are clear. Impression IMPRESSION:     No acute intracranial abnormality on this noncontrast head CT. No change given  difference in technique. CTA CODE NEURO HEAD AND NECK W CONT    Narrative *PRELIMINARY REPORT*    No vascular occlusion or flow-limiting stenosis. No intracranial enhancement. No  CT evidence of acute infarct. Preliminary report was provided by Dr. Isha Lisa, the on-call radiologist, at 2354  hours. Final report to follow. *END PRELIMINARY REPORT*               CT CODE NEURO HEAD WO CONTRAST    Narrative EXAM: CT CODE NEURO HEAD WO CONTRAST    INDICATION: Code Stroke    COMPARISON: None. TECHNIQUE: Unenhanced CT of the head was performed using 5 mm images. Brain and  bone windows were generated. CT dose reduction was achieved through use of a  standardized protocol tailored for this examination and automatic exposure  control for dose modulation. FINDINGS:  The ventricles and sulci are normal in size, shape and configuration and  midline. There is no significant white matter disease. There is no intracranial  hemorrhage, extra-axial collection, mass, mass effect or midline shift. The  basilar cisterns are open. No acute infarct is identified. The bone windows  demonstrate no abnormalities. The visualized portions of the paranasal sinuses  and mastoid air cells are clear. Impression IMPRESSION: No acute intracranial process identified by noncontrast CT     Assessment:     Hospital Problems  Date Reviewed: 2/19/2020    None        Plan:     1.) CVA              - s/p tPA in ED 01/14/21 at 22:30              - CT head post-tpa was completed on arrival to ER at [address] [place] [address] which was negative for hemorrhage.               - No ASA/anticoagulants until 24 hours post tPA and imaging is negative for hemorrhage              - NIHSS of 5 on original exam, now a 1 for mild sensory change on right cheek              - q1 neuro checks              - A1C and lipid panel pending, LDL goal <70              - MRI brain ordered for am.  Patient states she has severe claustrophobia and has  much  difficulty tolerating MRI. Will need sedation for MRI. -CTH 24 hours after tPA administration to rule out hemorrhage.               - ECHO ordered              - PT/OT/SLP evals              - Stroke education     2.) Hx HTN              - SBP goal 140-180, allow permissive HTN in the setting of acute CVA              - Labetalol/Cardene PRN              - Home meds per Intensivist    3.)Headache   -Will treat with Tylenol and Reglan for now as needed. I have discussed the diagnosis and the intended plan as seen in the above orders with Dr. Leesa Galvan , the patient and the primary RN. Patient updated on current plan of care and is in agreement. All questions were answered. Thank you for this consult and participating in the care of this patient.   Signed By: Preethi Odom Alden Cedillo NP     January 15, 2021

## 2021-01-16 LAB
ALBUMIN SERPL-MCNC: 3.4 G/DL (ref 3.5–5)
ALBUMIN/GLOB SERPL: 1 {RATIO} (ref 1.1–2.2)
ALP SERPL-CCNC: 57 U/L (ref 45–117)
ALT SERPL-CCNC: 28 U/L (ref 12–78)
ANION GAP SERPL CALC-SCNC: 6 MMOL/L (ref 5–15)
AST SERPL-CCNC: 12 U/L (ref 15–37)
BASOPHILS # BLD: 0 K/UL (ref 0–0.1)
BASOPHILS NFR BLD: 0 % (ref 0–1)
BILIRUB SERPL-MCNC: 0.2 MG/DL (ref 0.2–1)
BUN SERPL-MCNC: 18 MG/DL (ref 6–20)
BUN/CREAT SERPL: 26 (ref 12–20)
CALCIUM SERPL-MCNC: 8.4 MG/DL (ref 8.5–10.1)
CHLORIDE SERPL-SCNC: 110 MMOL/L (ref 97–108)
CHOLEST SERPL-MCNC: 137 MG/DL
CO2 SERPL-SCNC: 23 MMOL/L (ref 21–32)
CREAT SERPL-MCNC: 0.7 MG/DL (ref 0.55–1.02)
DIFFERENTIAL METHOD BLD: ABNORMAL
EOSINOPHIL # BLD: 0 K/UL (ref 0–0.4)
EOSINOPHIL NFR BLD: 0 % (ref 0–7)
ERYTHROCYTE [DISTWIDTH] IN BLOOD BY AUTOMATED COUNT: 13.8 % (ref 11.5–14.5)
GLOBULIN SER CALC-MCNC: 3.5 G/DL (ref 2–4)
GLUCOSE SERPL-MCNC: 151 MG/DL (ref 65–100)
HCT VFR BLD AUTO: 38.3 % (ref 35–47)
HDLC SERPL-MCNC: 45 MG/DL
HDLC SERPL: 3 {RATIO} (ref 0–5)
HGB BLD-MCNC: 12.7 G/DL (ref 11.5–16)
IMM GRANULOCYTES # BLD AUTO: 0.2 K/UL (ref 0–0.04)
IMM GRANULOCYTES NFR BLD AUTO: 1 % (ref 0–0.5)
LACTATE SERPL-SCNC: 1.1 MMOL/L (ref 0.4–2)
LDLC SERPL CALC-MCNC: 72.8 MG/DL (ref 0–100)
LIPID PROFILE,FLP: NORMAL
LYMPHOCYTES # BLD: 2.9 K/UL (ref 0.8–3.5)
LYMPHOCYTES NFR BLD: 17 % (ref 12–49)
MCH RBC QN AUTO: 29.3 PG (ref 26–34)
MCHC RBC AUTO-ENTMCNC: 33.2 G/DL (ref 30–36.5)
MCV RBC AUTO: 88.5 FL (ref 80–99)
MONOCYTES # BLD: 2.4 K/UL (ref 0–1)
MONOCYTES NFR BLD: 14 % (ref 5–13)
NEUTS SEG # BLD: 11.4 K/UL (ref 1.8–8)
NEUTS SEG NFR BLD: 68 % (ref 32–75)
NRBC # BLD: 0 K/UL (ref 0–0.01)
NRBC BLD-RTO: 0 PER 100 WBC
PLATELET # BLD AUTO: 335 K/UL (ref 150–400)
PLATELET COMMENTS,PCOM: ABNORMAL
PMV BLD AUTO: 9.8 FL (ref 8.9–12.9)
POTASSIUM SERPL-SCNC: 3.9 MMOL/L (ref 3.5–5.1)
PROT SERPL-MCNC: 6.9 G/DL (ref 6.4–8.2)
RBC # BLD AUTO: 4.33 M/UL (ref 3.8–5.2)
RBC MORPH BLD: ABNORMAL
SODIUM SERPL-SCNC: 139 MMOL/L (ref 136–145)
TRIGL SERPL-MCNC: 96 MG/DL (ref ?–150)
VLDLC SERPL CALC-MCNC: 19.2 MG/DL
WBC # BLD AUTO: 16.9 K/UL (ref 3.6–11)

## 2021-01-16 PROCEDURE — 74011250637 HC RX REV CODE- 250/637: Performed by: NURSE PRACTITIONER

## 2021-01-16 PROCEDURE — 36415 COLL VENOUS BLD VENIPUNCTURE: CPT

## 2021-01-16 PROCEDURE — 80061 LIPID PANEL: CPT

## 2021-01-16 PROCEDURE — 74011250636 HC RX REV CODE- 250/636: Performed by: NURSE PRACTITIONER

## 2021-01-16 PROCEDURE — 85025 COMPLETE CBC W/AUTO DIFF WBC: CPT

## 2021-01-16 PROCEDURE — 97116 GAIT TRAINING THERAPY: CPT

## 2021-01-16 PROCEDURE — 97161 PT EVAL LOW COMPLEX 20 MIN: CPT

## 2021-01-16 PROCEDURE — 80053 COMPREHEN METABOLIC PANEL: CPT

## 2021-01-16 PROCEDURE — 65270000029 HC RM PRIVATE

## 2021-01-16 PROCEDURE — 74011000250 HC RX REV CODE- 250: Performed by: NURSE PRACTITIONER

## 2021-01-16 PROCEDURE — 83605 ASSAY OF LACTIC ACID: CPT

## 2021-01-16 RX ADMIN — FAMOTIDINE 20 MG: 10 INJECTION, SOLUTION INTRAVENOUS at 07:55

## 2021-01-16 RX ADMIN — Medication 10 ML: at 14:00

## 2021-01-16 RX ADMIN — Medication 10 ML: at 07:55

## 2021-01-16 RX ADMIN — Medication 10 ML: at 20:49

## 2021-01-16 RX ADMIN — FAMOTIDINE 20 MG: 10 INJECTION, SOLUTION INTRAVENOUS at 20:48

## 2021-01-16 RX ADMIN — ACETAMINOPHEN 650 MG: 325 TABLET ORAL at 11:06

## 2021-01-16 NOTE — PROGRESS NOTES
[facility]  Hospitalist Group                                                                                          Hospitalist Progress Note  Ivelisse Hansen MD  Answering service: [phone] OR [phone] from in house phone        Date of Service:  2021  NAME:  Juan R Gorman  :  1975  MRN:  [id number]      Admission Summary:   Post code cva, post TPA, now neg MRI rox/     Interval history / Subjective:       2021 : other than mild r LE paresis/esthesia, pt is asymptomatic except HA, tylenol beneficial  Home          Assessment & Plan:     · Post code stroke, neg MRI, had TPA, echo: LV: Calculated LVEF is 69%. Normal cavity size, wall thickness, systolic function (ejection fraction normal) and diastolic function. Saline contrast was given to evaluate for intracardiac shunt. Negative bubble study with no evidence of intracardiac shunt. CTA's neg as well  Lab Results   Component Value Date/Time    Hemoglobin A1c 6.4 (H) 2020 08:41 AM    No results found for: CHOL, CHOLPOCT, CHOLX, CHLST, CHOLV, HDL, HDLPOC, HDLP, LDL, LDLCPOC, LDLC, DLDLP, VLDLC, VLDL, TGLX, TRIGL, TRIGP, TGLPOCT, CHHD, CHHDX     Lab Results   Component Value Date/Time    Cholesterol, total 137 2021 01:36 AM    HDL Cholesterol 45 2021 01:36 AM    LDL, calculated 72.8 2021 01:36 AM    VLDL, calculated 19.2 2021 01:36 AM    Triglyceride 96 2021 01:36 AM    CHOL/HDL Ratio 3.0 2021 01:36 AM     Pt at goal, no need for statins   Antiplatelets per Neurology.  Pending decision  Home when stable post TPA      Home when cleared by subspeciatly   Code status: full   DVT prophylaxis: post TPA anticoag effects         Hospital Problems  Date Reviewed: 2020          Codes Class Noted POA    Aphasia ICD-10-CM: R47.01  ICD-9-CM: 784.3  1/15/2021 Unknown                   After personally interviewing pt at bedside the following is noted on 2021 :    Review of Systems Constitutional: Negative. HENT: Negative. Eyes: Negative. Respiratory: Negative. Cardiovascular: Negative. Gastrointestinal: Negative. Musculoskeletal: Negative. Skin: Negative. Neurological: Positive for sensory change, focal weakness and headaches. Psychiatric/Behavioral: Negative. I had a face to face encounter with patient on 1/16/2021 at bedside for the following physical exam:     PHYSICAL EXAM:    Visit Vitals  /89 (BP 1 Location: Left arm, BP Patient Position: At rest)   Pulse 100   Temp 98.7 °F (37.1 °C)   Resp 13   Ht 5' 6\" (1.676 m)   Wt 129.4 kg (285 lb 4.8 oz)   LMP 12/24/2020   SpO2 97%   BMI 46.05 kg/m²          Physical Exam  Constitutional:       Appearance: Normal appearance. She is obese. HENT:      Head: Normocephalic and atraumatic. Right Ear: External ear normal.      Left Ear: External ear normal.      Nose: Nose normal.      Mouth/Throat:      Mouth: Mucous membranes are moist.      Pharynx: Oropharynx is clear. Eyes:      Extraocular Movements: Extraocular movements intact. Conjunctiva/sclera: Conjunctivae normal.      Pupils: Pupils are equal, round, and reactive to light. Neck:      Musculoskeletal: Normal range of motion and neck supple. Cardiovascular:      Rate and Rhythm: Normal rate and regular rhythm. Heart sounds: No murmur. No gallop. Pulmonary:      Effort: Pulmonary effort is normal.      Breath sounds: Normal breath sounds. Abdominal:      General: Abdomen is flat. Bowel sounds are normal.      Palpations: Abdomen is soft. Musculoskeletal: Normal range of motion. General: No swelling. Skin:     General: Skin is warm and dry. Neurological:      Mental Status: She is alert and oriented to person, place, and time. Mental status is at baseline.       Comments: Except reported mild Rt le weakness, paresthesia    Psychiatric:         Mood and Affect: Mood normal.         Behavior: Behavior normal. Judgment: Judgment normal.                    Data Review:    Review and/or order of clinical lab test      Labs:     Recent Labs     01/16/21 0136 01/14/21 2149   WBC 16.9* 13.8*   HGB 12.7 14.2   HCT 38.3 42.8    352     Recent Labs     01/16/21  0136 01/15/21  0347 01/14/21  2149     --  139   K 3.9  --  3.7   *  --  103   CO2 23  --  22   BUN 18  --  21*   CREA 0.70  --  0.64   *  --  95   CA 8.4*  --  8.8   MG  --  2.1  --    PHOS  --  3.4  --      Recent Labs     01/16/21 0136 01/14/21 2149   ALT 28 34   AP 57 58   TBILI 0.2 0.3   TP 6.9 7.3   ALB 3.4* 3.6   GLOB 3.5 3.7     Recent Labs     01/14/21 2149   INR 1.0   PTP 9.9      No results for input(s): FE, TIBC, PSAT, FERR in the last 72 hours. No results found for: FOL, RBCF   No results for input(s): PH, PCO2, PO2 in the last 72 hours.   Recent Labs     01/15/21  0347 01/14/21  2149   TROIQ <0.05 <0.05     No results found for: CHOL, CHOLX, CHLST, CHOLV, HDL, HDLP, LDL, LDLC, DLDLP, TGLX, TRIGL, TRIGP, CHHD, CHHDX  Lab Results   Component Value Date/Time    Glucose (POC) 90 01/14/2021 09:31 PM     No results found for: COLOR, APPRN, SPGRU, REFSG, ZACKARY, PROTU, GLUCU, KETU, BILU, UROU, JONNA, LEUKU, GLUKE, EPSU, BACTU, WBCU, RBCU, CASTS, UCRY      Medications Reviewed:     Current Facility-Administered Medications   Medication Dose Route Frequency    sodium chloride (NS) flush 5-40 mL  5-40 mL IntraVENous Q8H    sodium chloride (NS) flush 5-40 mL  5-40 mL IntraVENous PRN    acetaminophen (TYLENOL) tablet 650 mg  650 mg Oral Q6H PRN    Or    acetaminophen (TYLENOL) suppository 650 mg  650 mg Rectal Q6H PRN    polyethylene glycol (MIRALAX) packet 17 g  17 g Oral DAILY PRN    ondansetron (ZOFRAN) injection 4 mg  4 mg IntraVENous Q6H PRN    famotidine (PF) (PEPCID) 20 mg in 0.9% sodium chloride 10 mL injection  20 mg IntraVENous BID    metoclopramide HCl (REGLAN) injection 5 mg  5 mg IntraVENous Q6H PRN    labetaloL (NORMODYNE;TRANDATE) injection 10 mg  10 mg IntraVENous Q4H PRN     ______________________________________________________________________  EXPECTED LENGTH OF STAY: 3d 0h  ACTUAL LENGTH OF STAY:          1                 Lauryn Palmer MD

## 2021-01-16 NOTE — PROGRESS NOTES
PHYSICAL THERAPY EVALUATION/DISCHARGE  Patient: Robina Kim (62 y.o. female)  Date: 1/16/2021  Primary Diagnosis: Aphasia [R47.01]       Precautions:          ASSESSMENT  Based on the objective data described below, the patient presents with mild weakness and diminished light touch R LE s/p admit with confusion, slurred speech, R weakness. Pt received t-PTA 1/14/2021,  MRI negative for CVA. Of note, pt with h/o migraine and chronic spine/ SI issues related to remote h/o fusion for scoliosis. Pt demo indep with functional mobility. Pt was surprised by mild R LE weakness on MMT as she has been ambulating ad janna in room without difficulty. Pt declined stair assessment, however no concerns about pt ability to complete task. Discussed use of step-to sequence due to mild R LE weakness; pt verbalized understanding. Pt with acute PT needs. No d/c concerns related to mobility. Pt may benefit from follow up outpt PT to address strength deficit and chronic spine issues. Functional Outcome Measure: The patient scored 54/56 on the Wright balance outcome measure which is indicative of low risk for fall. Other factors to consider for discharge:      Further skilled acute physical therapy is not indicated at this time.      PLAN :  Recommendation for discharge: (in order for the patient to meet his/her long term goals)  Outpatient physical therapy follow up recommended for addressing R LE weakness and chronic back pain    This discharge recommendation:  Has not yet been discussed the attending provider and/or case management    IF patient discharges home will need the following DME: patient owns DME required for discharge (uses SPC on occasion)       SUBJECTIVE:   Patient stated Wow, I didn't know that was still weaker; I've been walking fine re MMT R LE    OBJECTIVE DATA SUMMARY:   HISTORY:    Past Medical History:   Diagnosis Date    ADHD     History of fusion of spine for scoliosis     Migraines     Osteoarthritis     lower spine     Past Surgical History:   Procedure Laterality Date    HX  SECTION      x1    HX CHOLECYSTECTOMY  2011    HX LAP GASTRIC BYPASS  2008    HX OTHER SURGICAL      double spinal fusion    HX OTHER SURGICAL  2016    lap band removal    HX TONSILLECTOMY      HX WISDOM TEETH EXTRACTION      IR INJ FACET LUMBAR / SACRAL SINGLE  2019       Prior level of function: indep with mobility, occasional use of SPC when spinal OA exacerbated; works from home.  Pt states that she was planning to pursue outpt PT for back issues PTA  Personal factors and/or comorbidities impacting plan of care: h/o chronic pain related to OA of spine and SIJ with remote h/o fusion for scoliosis    Home Situation  Home Environment: Private residence  # Steps to Enter: 4  Rails to Enter: Yes  Hand Rails : Bilateral  One/Two Story Residence: Two story  Interior Rails: Both  Living Alone: No  Support Systems: Spouse/Significant Other/Partner, Child(geneva)( and 11 y/o daughter)  Current DME Used/Available at Home: Cane, straight    EXAMINATION/PRESENTATION/DECISION MAKING:   Critical Behavior:  Neurologic State: Alert  Orientation Level: Oriented X4  Cognition: Follows commands  Safety/Judgement: Awareness of environment    Range Of Motion:  AROM: Within functional limits                       Strength:    Strength: (L LE WFL; R hip flex 4/5, knee ext 4+/5, flex 4-/5, DF 4/5)                    Tone & Sensation:   Tone: Normal              Sensation: Impaired(light touch diminished R LE)               Coordination:  Coordination: Within functional limits  Vision:      Functional Mobility:  Bed Mobility:     Supine to Sit: Independent  Sit to Supine: Independent  Scooting: Independent  Transfers:  Sit to Stand: Independent  Stand to Sit: Independent        Bed to Chair: Independent              Balance:   Sitting: Intact  Standing: Intact  Ambulation/Gait Training:  Distance (ft): 300 Feet (ft) Ambulation - Level of Assistance: Independent            Functional Measure:  Wright Balance Test:    Sitting to Standin  Standing Unsupported: 4  Sitting with Back Unsupported: 4  Standing to Sittin  Transfers: 4  Standing Unsupported with Eyes Closed: 4  Standing Unsupported with Feet Together: 4  Reach Forward with Outstretched Arm: 4   Object: 4  Turn to Look Over Shoulders: 4  Turn 360 Degrees: 4  Alternate Foot on Step/Stool: 4  Standing Unsupported One Foot in Front: 4  Stand on One Le  Total: 54/56         56=Maximum possible score;   0-20=High fall risk  21-40=Moderate fall risk   41-56=Low fall risk                  Physical Therapy Evaluation Charge Determination   History Examination Presentation Decision-Making   MEDIUM  Complexity : 1-2 comorbidities / personal factors will impact the outcome/ POC  MEDIUM Complexity : 3 Standardized tests and measures addressing body structure, function, activity limitation and / or participation in recreation  LOW Complexity : Stable, uncomplicated  LOW Complexity : FOTO score of       Based on the above components, the patient evaluation is determined to be of the following complexity level: LOW     Pain Rating:  Pt reports ongoing mild HA    Activity Tolerance:   Good      After treatment patient left in no apparent distress:   Call bell within reach and sitting EOB    COMMUNICATION/EDUCATION:   The patients plan of care was discussed with: Registered nurse. Fall prevention education was provided and the patient/caregiver indicated understanding., Patient/family have participated as able in goal setting and plan of care. and Patient/family agree to work toward stated goals and plan of care.     Thank you for this referral.  Brandi Corado, PT   Time Calculation: 30 mins

## 2021-01-16 NOTE — PROGRESS NOTES
Physical Therapy    PT  evaluation completed. Pt presents with mild R LE weakness and diminished light touch R LE, however demo indep with functional mobility. Pt appropriate to ambulate ad janna. No further acute PT needs. Recommend follow up outpt PT which pt states she was planning to initiate due to chronic spine/ SI issues. Full report to follow.     Joe Aviles, PT, MPT

## 2021-01-17 VITALS
WEIGHT: 293 LBS | BODY MASS INDEX: 47.09 KG/M2 | TEMPERATURE: 97.9 F | RESPIRATION RATE: 14 BRPM | OXYGEN SATURATION: 97 % | HEIGHT: 66 IN | SYSTOLIC BLOOD PRESSURE: 135 MMHG | HEART RATE: 75 BPM | DIASTOLIC BLOOD PRESSURE: 94 MMHG

## 2021-01-17 PROBLEM — R47.01 APHASIA: Status: RESOLVED | Noted: 2021-01-15 | Resolved: 2021-01-17

## 2021-01-17 PROCEDURE — 74011250636 HC RX REV CODE- 250/636: Performed by: NURSE PRACTITIONER

## 2021-01-17 PROCEDURE — 74011250637 HC RX REV CODE- 250/637: Performed by: NURSE PRACTITIONER

## 2021-01-17 PROCEDURE — 74011000250 HC RX REV CODE- 250: Performed by: NURSE PRACTITIONER

## 2021-01-17 RX ORDER — GUAIFENESIN 100 MG/5ML
81 LIQUID (ML) ORAL DAILY
Qty: 30 TAB | Refills: 0 | Status: SHIPPED | OUTPATIENT
Start: 2021-01-17

## 2021-01-17 RX ORDER — ESCITALOPRAM OXALATE 5 MG/1
5 TABLET ORAL DAILY
Qty: 30 TAB | Refills: 1 | Status: SHIPPED | OUTPATIENT
Start: 2021-01-17 | End: 2021-06-18

## 2021-01-17 RX ADMIN — ACETAMINOPHEN 650 MG: 325 TABLET ORAL at 00:10

## 2021-01-17 RX ADMIN — ACETAMINOPHEN 650 MG: 325 TABLET ORAL at 06:04

## 2021-01-17 RX ADMIN — FAMOTIDINE 20 MG: 10 INJECTION, SOLUTION INTRAVENOUS at 09:07

## 2021-01-17 RX ADMIN — Medication 10 ML: at 06:00

## 2021-01-17 NOTE — DISCHARGE SUMMARY
Discharge Summary       PATIENT ID: Monica Carrizales  MRN: [id number]   YOB: 1975    DATE OF ADMISSION: 1/14/2021  9:23 PM    DATE OF DISCHARGE: 1/17/2021   PRIMARY CARE PROVIDER: Edna Agee NP     ATTENDING PHYSICIAN: Morgan Garrett MD   DISCHARGING PROVIDER: Morgan Garrett MD    To contact this individual call [phone] and ask the  to page. If unavailable ask to be transferred the Adult Hospitalist Department. CONSULTATIONS: IP CONSULT TO INTENSIVIST  IP CONSULT TO NEUROLOGY    PROCEDURES/SURGERIES: * No surgery found *    ADMITTING DIAGNOSES & HOSPITAL COURSE:   Pt w aphasia in TPA window, treated as cute CVA w TPA, no sequela, neg MRI brain, neg CTA brain,neck, Lipid panel w/o treatment acceptable,   Lab Results   Component Value Date/Time    Hemoglobin A1c 6.4 (H) 09/24/2020 08:41 AM      Lab Results   Component Value Date/Time    Cholesterol, total 137 01/16/2021 01:36 AM    HDL Cholesterol 45 01/16/2021 01:36 AM    LDL, calculated 72.8 01/16/2021 01:36 AM    VLDL, calculated 19.2 01/16/2021 01:36 AM    Triglyceride 96 01/16/2021 01:36 AM    CHOL/HDL Ratio 3.0 01/16/2021 01:36 AM      Pt to manage diet wt loss as out pt with pcp     No PT needed. DISCHARGE DIAGNOSES / PLAN:      1. Acute cva  2. Post TPA  3. Elevated a1c  4. Morbid Obesity Body mass index is 48.32 kg/m².       ADDITIONAL CARE RECOMMENDATIONS:   Na     PENDING TEST RESULTS:   At the time of discharge the following test results are still pending: na    FOLLOW UP APPOINTMENTS:    Follow-up Information     Follow up With Specialties Details Why Contact Info    Edna Agee NP Nurse Practitioner  As needed [address] [place] [address]                DIET: Pt to manage diet wt loss as out pt with pcp      ACTIVITY: Activity as tolerated    WOUND CARE: na    EQUIPMENT needed: na      DISCHARGE MEDICATIONS:  Current Discharge Medication List      START taking these medications    Details   aspirin 81 mg chewable tablet Take 1 Tab by mouth daily. Qty: 30 Tab, Refills: 0         CONTINUE these medications which have CHANGED    Details   escitalopram oxalate (LEXAPRO) 5 mg tablet Take 1 Tab by mouth daily. Qty: 30 Tab, Refills: 1               NOTIFY YOUR PHYSICIAN FOR ANY OF THE FOLLOWING:   Fever over 101 degrees for 24 hours. Chest pain, shortness of breath, fever, chills, nausea, vomiting, diarrhea, change in mentation, falling, weakness, bleeding. Severe pain or pain not relieved by medications. Or, any other signs or symptoms that you may have questions about.     DISPOSITION:    Home With:   OT  PT  HH  RN       Long term SNF/Inpatient Rehab   x Independent/assisted living    Hospice    Other:       PATIENT CONDITION AT DISCHARGE:     Functional status    Poor     Deconditioned    x Independent      Cognition    x Lucid     Forgetful     Dementia      Catheters/lines (plus indication)    Ware     PICC     PEG    x None      Code status   x  Full code     DNR      PHYSICAL EXAMINATION AT DISCHARGE:  Visit Vitals  BP (!) 135/94 (BP 1 Location: Left arm, BP Patient Position: At rest)   Pulse 75   Temp 97.9 °F (36.6 °C)   Resp 14   Ht 5' 6\" (1.676 m)   Wt 135.8 kg (299 lb 6.4 oz)   LMP 12/24/2020   SpO2 97%   BMI 48.32 kg/m²          CHRONIC MEDICAL DIAGNOSES:  Problem List as of 1/17/2021 Date Reviewed: 1/17/2021          Codes Class Noted - Resolved    Obesity, morbid ([facility] ICD-10-CM: E66.01  ICD-9-CM: 278.01  12/24/2018 - Present        Migraine with aura and without status migrainosus, not intractable ICD-10-CM: G43.109  ICD-9-CM: 346.00  12/24/2018 - Present        ADHD ICD-10-CM: F90.9  ICD-9-CM: 314.01  12/24/2018 - Present        History of fusion of spine for scoliosis ICD-10-CM: Z98.1, Z87.39  ICD-9-CM: V45.4, V13.59  12/24/2018 - Present        RESOLVED: Aphasia ICD-10-CM: R47.01  ICD-9-CM: 784.3  1/15/2021 - 1/17/2021              Greater than 20 minutes were spent with the patient on counseling and coordination of care    Signed:   Irma Garcia MD  1/17/2021  11:44 AM

## 2021-01-17 NOTE — DISCHARGE INSTRUCTIONS
Patient Education   Learning About Coronavirus [phone])  Coronavirus [phone]): Overview  What is coronavirus (EQQFD-13)? The coronavirus disease (COVID-19) is caused by a virus. It is an illness that was first found in [place], [place], in December 2019. It has since spread worldwide. The virus can cause fever, cough, and trouble breathing. In severe cases, it can cause pneumonia and make it hard to breathe without help. It can cause death. Coronaviruses are a large group of viruses. They cause the common cold. They also cause more serious illnesses like Middle East respiratory syndrome (MERS) and severe acute respiratory syndrome (SARS). COVID-19 is caused by a novel coronavirus. That means it's a new type that has not been seen in people before. This virus spreads person-to-person through droplets from coughing and sneezing. It can also spread when you are close to someone who is infected. And it can spread when you touch something that has the virus on it, such as a doorknob or a tabletop. What can you do to protect yourself from coronavirus (COVID-19)? The best way to protect yourself from getting sick is to:  · Avoid areas where there is an outbreak. · Avoid contact with people who may be infected. · Wash your hands often with soap or alcohol-based hand sanitizers. · Avoid crowds and try to stay at least 6 feet away from other people. · Wash your hands often, especially after you cough or sneeze. Use soap and water, and scrub for at least 20 seconds. If soap and water aren't available, use an alcohol-based hand . · Avoid touching your mouth, nose, and eyes. What can you do to avoid spreading the virus to others? To help avoid spreading the virus to others:  · Cover your mouth with a tissue when you cough or sneeze. Then throw the tissue in the trash. · Use a disinfectant to clean things that you touch often. · Stay home if you are sick or have been exposed to the virus.  Don't go to school, work, or public areas. And don't use public transportation. · If you are sick:  ? Leave your home only if you need to get medical care. But call the doctor's office first so they know you're coming. And wear a face mask, if you have one.  ? If you have a face mask, wear it whenever you're around other people. It can help stop the spread of the virus when you cough or sneeze. ? Clean and disinfect your home every day. Use household  and disinfectant wipes or sprays. Take special care to clean things that you grab with your hands. These include doorknobs, remote controls, phones, and handles on your refrigerator and microwave. And don't forget countertops, tabletops, bathrooms, and computer keyboards. When to call for help  Call 911 anytime you think you may need emergency care. For example, call if:  · You have severe trouble breathing. (You can't talk at all.)  · You have constant chest pain or pressure. · You are severely dizzy or lightheaded. · You are confused or can't think clearly. · Your face and lips have a blue color. · You pass out (lose consciousness) or are very hard to wake up. Call your doctor now if you develop symptoms such as:  · Shortness of breath. · Fever. · Cough. If you need to get care, call ahead to the doctor's office for instructions before you go. Make sure you wear a face mask, if you have one, to prevent exposing other people to the virus. Where can you get the latest information? The following health organizations are tracking and studying this virus. Their websites contain the most up-to-date information. Elina Wilson also learn what to do if you think you may have been exposed to the virus. · U.S. Centers for Disease Control and Prevention (CDC): The CDC provides updated news about the disease and travel advice. The website also tells you how to prevent the spread of infection.  www.cdc.gov  · World Health Organization [facility]): WHO offers information about the virus outbreaks. WHO also has travel advice. www.who.int  Current as of: April 1, 2020               Content Version: 12.4  © 2006-2020 [employer]. Care instructions adapted under license by your healthcare professional. If you have questions about a medical condition or this instruction, always ask your healthcare professional. [address] any warranty or liability for your use of this information. Discharge Instructions       PATIENT ID: Elif Thomas  MRN: [id number]   YOB: 1975    DATE OF ADMISSION: 1/14/2021  9:23 PM    DATE OF DISCHARGE: 1/17/2021    PRIMARY CARE PROVIDER: Aj Terrell NP     ATTENDING PHYSICIAN: Liliana Faulkner MD  DISCHARGING PROVIDER: Daisy Trinidad MD    To contact this individual call [phone] and ask the  to page. If unavailable ask to be transferred the Adult Hospitalist Department. DISCHARGE DIAGNOSES acute cva, post TPA no residuals, neg MRI /cta/ct brain   Possible early diabetes, for wt and diet management first line therapy     CONSULTATIONS: IP CONSULT TO INTENSIVIST  IP CONSULT TO NEUROLOGY    PROCEDURES/SURGERIES: * No surgery found *    PENDING TEST RESULTS:   At the time of discharge the following test results are still pending: na    FOLLOW UP APPOINTMENTS:   Follow-up Information     Follow up With Specialties Details Why Contact Info    Aj Terrell NP Nurse Practitioner  As needed [address] [place] [address]  [phone]             ADDITIONAL CARE RECOMMENDATIONS:  Pt to manage diet wt loss as out pt with pcp     DIET: work with pcp on diet plan,      ACTIVITY: Activity as tolerated    WOUND CARE: na    EQUIPMENT needed: na      Radiology: as above, neg and neg ECHO bubble study     DISCHARGE MEDICATIONS:   See Medication Reconciliation Form    · It is important that you take the medication exactly as they are prescribed.    · Keep your medication in the bottles provided by the pharmacist and keep a list of the medication names, dosages, and times to be taken in your wallet. · Do not take other medications without consulting your doctor. NOTIFY YOUR PHYSICIAN FOR ANY OF THE FOLLOWING:   Fever over 101 degrees for 24 hours. Chest pain, shortness of breath, fever, chills, nausea, vomiting, diarrhea, change in mentation, falling, weakness, bleeding. Severe pain or pain not relieved by medications. Or, any other signs or symptoms that you may have questions about.       DISPOSITION:    Home With:   OT  PT  HH  RN       SNF/Inpatient Rehab/LTAC   x Independent/assisted living    Hospice    Other:          Signed:   Ashley Sigala MD  1/17/2021  11:41 AM

## 2021-01-17 NOTE — PROGRESS NOTES
Bedside RN performed patient education and medication education. Discharge concerns initiated and discussed with patient, including clarification on \"who\" assists the patient at their home and instructions for when the home going patient should call their provider after discharge. Opportunity for questions and clarification was provided. Patient receptive to education: YES  Patient stated: \"I used to take lexapro at home. \"  Barriers to Education: None  Diagnosis Education given:  YES    Length of stay: 2  Expected Day of Discharge: 1/17/2020  Ask if they have \"Help at Home\" & add to white board? YES    Education Day #: 2    Medication Education Given:  YES  M in the box Medication name: Aspirin    Pt aware of HCAHPS survey: YES            Stroke Education documented in Patient Education: YES  Core Measures Documented in [facility]:  Risk Factors: YES  Warning signs of stroke: YES  When to Activate 911: YES  Medication Education for Risk Factors: YES  Smoking cessation if applicable: YES  Written Education Given:  YES    Discharge NIH Completed: YES  Score: 0    BRAINS: NO    Follow Up Appointment Made: NO  Date/Time if applicable: Follow up in one week with PCP    I have reviewed discharge instructions with the patient and spouse. The patient and spouse verbalized understanding. Patient is being discharged to home. IV removed. Belongings with patient.

## 2021-01-20 ENCOUNTER — VIRTUAL VISIT (OUTPATIENT)
Dept: PRIMARY CARE CLINIC | Age: 46
End: 2021-01-20
Payer: OTHER GOVERNMENT

## 2021-01-20 DIAGNOSIS — R73.03 PREDIABETES: ICD-10-CM

## 2021-01-20 DIAGNOSIS — R73.03 PRE-DIABETES: ICD-10-CM

## 2021-01-20 DIAGNOSIS — G43.109 MIGRAINE WITH AURA AND WITHOUT STATUS MIGRAINOSUS, NOT INTRACTABLE: Primary | ICD-10-CM

## 2021-01-20 DIAGNOSIS — E66.01 OBESITY, MORBID (HCC): Primary | ICD-10-CM

## 2021-01-20 DIAGNOSIS — F41.9 ANXIETY: ICD-10-CM

## 2021-01-20 DIAGNOSIS — Z09 HOSPITAL DISCHARGE FOLLOW-UP: ICD-10-CM

## 2021-01-20 DIAGNOSIS — E66.01 CLASS 3 SEVERE OBESITY DUE TO EXCESS CALORIES WITHOUT SERIOUS COMORBIDITY WITH BODY MASS INDEX (BMI) OF 45.0 TO 49.9 IN ADULT (HCC): ICD-10-CM

## 2021-01-20 PROCEDURE — 99496 TRANSJ CARE MGMT HIGH F2F 7D: CPT | Performed by: NURSE PRACTITIONER

## 2021-01-20 PROCEDURE — 1111F DSCHRG MED/CURRENT MED MERGE: CPT | Performed by: NURSE PRACTITIONER

## 2021-01-20 RX ORDER — OXYCODONE AND ACETAMINOPHEN 5; 325 MG/1; MG/1
1 TABLET ORAL
Qty: 12 TAB | Refills: 0 | Status: SHIPPED | OUTPATIENT
Start: 2021-01-20 | End: 2021-01-23

## 2021-01-20 NOTE — PROGRESS NOTES
Radha Richards (: 1975) is a 39 y.o. female, established patient, here for evaluation of the following chief complaint(s):   Hospital Follow Up       ASSESSMENT/PLAN:  1. Migraine with aura and without status migrainosus, not intractable  -     REFERRAL TO NEUROLOGY  -     oxyCODONE-acetaminophen (PERCOCET) 5-325 mg per tablet; Take 1 Tab by mouth every four (4) hours as needed for Pain for up to 3 days. Max Daily Amount: 6 Tabs., Normal, Disp-12 Tab, R-0    2. Prediabetes  -     REFERRAL TO DIABETIC EDUCATION; Standing    3. Class 3 severe obesity due to excess calories without serious comorbidity with body mass index (BMI) of 45.0 to 49.9 in adult (HCC)  -     REFERRAL TO DIABETIC EDUCATION; Standing    4. Anxiety        -  Continue Lexapro 5 mg every day    Reviewed imaging and labs with patient. Return in about 4 weeks (around 2021), or if symptoms worsen or fail to improve, for anxiety- lexapro dose adjustment. SUBJECTIVE/OBJECTIVE:  HPI  Transition Care Management:    Admission: 21  Discharge: 21  Location: [facility]  Diagnosis: Acute CVA, Post TPA  Nurse Navigator note: reviewed    We reviewed the records. She presented to ED with aphasia. hemiplegia and headache then admitted. She was treated as acute CVA with TPA. Imaging including MRI brain, CTA brain and neck were read as normal.  Differential diagnosis- possible hemiplegic migraine HA. Treated with IV toradol for Pain. Neuro back to baseline with exception of right unilateral HA 8/10 pain and fatigue. Symptoms are are improving. She is taking her 81 mg aspirin as directed & without any side effects. Started on lexapro 5 mg every day for anxiety. Previously tolerated well without side effects. Endorses a PMHx of migraine HA with aura. HA have been well controlled for past 2 years. Denies past hemiplegic migraines. Review of Systems   Constitutional: Negative for activity change and fever. Respiratory: Negative for cough. Cardiovascular: Negative for palpitations. Neurological: Positive for headaches. Negative for seizures, facial asymmetry, speech difficulty (resolved) and weakness (resolved). Psychiatric/Behavioral: Negative for confusion (resolved). The patient is nervous/anxious (started lexapro). All other systems reviewed and are negative. No flowsheet data found. Physical Exam  Vitals signs reviewed. Constitutional:       General: She is not in acute distress. Appearance: She is obese. She is not diaphoretic. HENT:      Head: Normocephalic and atraumatic. Eyes:      Conjunctiva/sclera: Conjunctivae normal.   Neck:      Trachea: Phonation normal.   Pulmonary:      Effort: Pulmonary effort is normal. No respiratory distress. Skin:     General: Skin is dry. Neurological:      Mental Status: She is alert and oriented to person, place, and time. Psychiatric:         Attention and Perception: Attention and perception normal.         Mood and Affect: Mood normal.         Speech: Speech normal.         Behavior: Behavior normal.           Ange Chang is being evaluated by a Virtual Visit (video visit) encounter to address concerns as mentioned above. A caregiver was present when appropriate. Due to this being a TeleHealth encounter (During JPAIT-33 public health emergency), evaluation of the following organ systems was limited: Vitals/Constitutional/EENT/Resp/CV/GI//MS/Neuro/Skin/Heme-Lymph-Imm. Pursuant to the emergency declaration under the [address] [place] [address], [address] [place] [address] authority and the [employer] Act, this Virtual Visit was conducted with patient's (and/or legal guardian's) consent, to reduce the patient's risk of exposure to COVID-19 and provide necessary medical care.   The patient (and/or legal guardian) has also been advised to contact this office for worsening conditions or problems, and seek emergency medical treatment and/or call 911 if deemed necessary. Patient identification was verified at the start of the visit: YES    Services were provided through a video synchronous discussion virtually to substitute for in-person clinic visit. Patient was located at home and provider was located in office or at home. An electronic signature was used to authenticate this note.   -- Eusebio Orr NP

## 2021-02-08 ENCOUNTER — VIRTUAL VISIT (OUTPATIENT)
Dept: DIABETES SERVICES | Age: 46
End: 2021-02-08
Payer: OTHER GOVERNMENT

## 2021-02-08 DIAGNOSIS — R73.03 PRE-DIABETES: ICD-10-CM

## 2021-02-08 DIAGNOSIS — E66.01 OBESITY, MORBID (HCC): Primary | ICD-10-CM

## 2021-02-08 PROCEDURE — 97802 MEDICAL NUTRITION INDIV IN: CPT | Performed by: DIETITIAN, REGISTERED

## 2021-02-08 NOTE — PROGRESS NOTES
[facility]  Initial Nutrition Assessment    Patient's Name: Mauro Pac  Date: 2021  Reason for Referral: pre-dm; wt loss  Referral Source: Pedro Luis Deleon MD    Nutrition Assessment:  Pertinent Medical History:  Past Medical History:   Diagnosis Date    ADHD     History of fusion of spine for scoliosis     Migraines     Osteoarthritis     lower spine     Past Surgical History:   Procedure Laterality Date    HX  SECTION      x1    HX CHOLECYSTECTOMY      HX LAP GASTRIC BYPASS      HX OTHER SURGICAL      double spinal fusion    HX OTHER SURGICAL  2016    lap band removal    HX TONSILLECTOMY      HX WISDOM TEETH EXTRACTION      IR INJ FACET LUMBAR / [address] SINGLE  2019     Patient Active Problem List   Diagnosis Code    Obesity, morbid ([facility] [address] E66.01    Migraine with aura and without status migrainosus, not intractable G43. 80    ADHD F90.9    History of fusion of spine for scoliosis Z98.1, Z87.39       Pertinent Medications/Supplements:  Prior to Admission medications    Medication Sig Start Date End Date Taking? Authorizing Provider   escitalopram oxalate (LEXAPRO) 5 mg tablet Take 1 Tab by mouth daily. 21   Jacob Parikh MD   aspirin 81 mg chewable tablet Take 1 Tab by mouth daily. 21   Jacob Parikh MD       Vitamin/mineral supplements: none    Pertinent Biochemical Data:  Lab Results   Component Value Date/Time    Sodium 139 2021 01:36 AM    Potassium 3.9 2021 01:36 AM    Chloride 110 (H) 2021 01:36 AM    CO2 23 2021 01:36 AM    Anion gap 6 2021 01:36 AM    Glucose 151 (H) 2021 01:36 AM    BUN 18 2021 01:36 AM    Creatinine 0.70 2021 01:36 AM    BUN/Creatinine ratio 26 (H) 2021 01:36 AM    GFR est AA >60 2021 01:36 AM    GFR est non-AA >60 2021 01:36 AM    Calcium 8.4 (L) 2021 01:36 AM    Bilirubin, total 0.2 2021 01:36 AM    Alk.  phosphatase 57 01/16/2021 01:36 AM    Protein, total 6.9 01/16/2021 01:36 AM    Albumin 3.4 (L) 01/16/2021 01:36 AM    Globulin 3.5 01/16/2021 01:36 AM    A-G Ratio 1.0 (L) 01/16/2021 01:36 AM    ALT (SGPT) 28 01/16/2021 01:36 AM    AST (SGOT) 12 (L) 01/16/2021 01:36 AM      Lab Results   Component Value Date/Time    Sodium 139 01/16/2021 01:36 AM    Potassium 3.9 01/16/2021 01:36 AM    Chloride 110 (H) 01/16/2021 01:36 AM    CO2 23 01/16/2021 01:36 AM    Anion gap 6 01/16/2021 01:36 AM    Glucose 151 (H) 01/16/2021 01:36 AM    BUN 18 01/16/2021 01:36 AM    Creatinine 0.70 01/16/2021 01:36 AM    BUN/Creatinine ratio 26 (H) 01/16/2021 01:36 AM    GFR est AA >60 01/16/2021 01:36 AM    GFR est non-AA >60 01/16/2021 01:36 AM    Calcium 8.4 (L) 01/16/2021 01:36 AM      Lab Results   Component Value Date/Time    Cholesterol, total 137 01/16/2021 01:36 AM    HDL Cholesterol 45 01/16/2021 01:36 AM    LDL, calculated 72.8 01/16/2021 01:36 AM    VLDL, calculated 19.2 01/16/2021 01:36 AM    Triglyceride 96 01/16/2021 01:36 AM    CHOL/HDL Ratio 3.0 01/16/2021 01:36 AM     Lab Results   Component Value Date/Time    Hemoglobin A1c 6.4 (H) 09/24/2020 08:41 AM     No results found for: MCACR, MCA1, MCA2, MCA3, MCAU, MCAU2, MCALPOCT  BP Readings from Last 2 Encounters:   01/17/21 (!) 135/94   02/19/20 (!) 138/95        Home blood glucose records: not checking BG often because she is pre-diabetic but she reports her BGs are higher in the morning  Anthropometrics:  Height:   Ht Readings from Last 1 Encounters:   01/15/21 5' 6\" (1.676 m)     Weight:   Wt Readings from Last 1 Encounters:   01/17/21 299 lb 6.4 oz (135.8 kg)     IBW: 59.1 kg (229%IBW)     UBW: n/a kg     Adjusted BW: n/a kg  BMI: 48.32 kg/m2     BMI indicative of: Obesity (Class III)  Goal Weight: n/a kg  Weight hx:    Wt Readings from Last 3 Encounters:   01/17/21 299 lb 6.4 oz (135.8 kg)   01/15/21 291 lb (132 kg)   02/19/20 297 lb 12.8 oz (135.1 kg)        Food- and nutrition-related history:  Pt's perceptions, values, motivation, barriers r/t nutrition problem: pt had lap band removed in 4996 due to complications and gained more than all of her weight back; has lost ~20# from cutting out sugar and refined sugars; feels \"kind of lost\" because she has been given such conflicting information  Prior education with RDN: for surgery  Prior DSMES: no  Current or previous diets: cutting out sugars/refined sugars  Food allergies: none  Eating environment/psychosocial/support: lives with  and 13 yo daughter; makes meal plans and  shops while she does most of the cooking  Knowledge/beliefs/attitudes about food/nutrition/health: knows a lot about nutrition - starchy vs non-starchy vegetables, healthy fats, protein sources, etc; she just was not aware about the amount of carbohydrates she needs per meal  Food security: can afford food  Cultural/Voodoo influences: none  Gastrointestinal: has bloating 1-2x/week but has not mentioned to provider  Other factors: pt has a lot of anxiety and fear around food and consuming too much    Pt has not had lap band for over 2 years now but is still used to and thinks she should be eating very little. She has lost around 20# with cutting out sugars/refined sugars but we discussed that is not super sustainable - she is not eating bread/rice/pasta etc. After discussing 24 hr recall with patient and past history, it is evident to me this pt needs more than education and weight loss. Her fear and anxiety around food is affecting how much she eats and in turn this affects her blood sugars and is causing her morning BG to be elevated. Per her 24 hr recall, she did not get even one serving of carbohydrates yesterday. When we were discussing her carbohydrate needs, she said she was actually getting anxious about the amount of carbohydrates she should be trying to eat her meals, not to mention that she should be eating 3 meals per day.  She seems to genuinely believe she does not deserve to eat as many calories as she needs. She is not going to be able to change her mindset in a healthy way without additional resources. We had this discussion and she agreed - she is actually scheduled to talk with a counselor tomorrow regarding her anxiety but now she knows to bring up her relationship with food and the anxiety that comes with it. She reports she had been trying to schedule something with a [facility] provider but she has not been able to get appointments or get her medications refilled. She said she had never thought about talking to a professional about her emotions towards food and calories and she appreciated the insight and validation. I explained that I have names of other dietitians in the area if disordered eating/relationship with food is out of his/her scope. We also discussed this is a whole new way of thinking and taking care of herself and it is going to take a long time to get used to so to make sure she takes baby steps and does not start doing everything at once. She verbalized understanding and did not have further questions. She is going to check her blood sugar if she wakes up in the middle of the night once or twice and she is going to check it for a few days in the morning before seeing me again. 24-hour recall:  Breakfast: coffee w unsweet vanilla almond milk and terra collagen  Lunch: did not eat; skips somewhat frequently  Dinner: bowl of soup - turkey sausage, potatoes, kale in chicken stock  Snacks: no  Beverages: ~32 oz water, 16 oz unsweet lemonade    Estimated calorie intake per recall: <500 kcal (Inadequate)  Estimated protein intake per recall: <10 gm protein (Inadequate)    Activity level: Sedentary - very limited due to back problems    Estimated Nutrition Needs:  Calorie needs (using [address] Jeor x 1. 2AF): ~2400 kcal/day  Protein needs (using 0.8-1 gm/kg Actual BW): 108-135 gm protein/day    Nutrition Diagnosis: (2/8/2021): (NI-5.8.4) Inconsistent carbohydrate intake related to food- and nutrition-related knowledge deficit and disordered eating as evidenced by estimated carbohydrate intake that is ingested on an irregular basis. Nutrition Interventions:  (E-1) Nutrition education - content related to carbohydrate sources and needs at each meal; carbs effects on BG levels and importance of eating them throughout the day    Patient Goals: pt will increase meals to 2 per day for 3 days each week and it will include at least 1 serving of cabohydrate    Resources Provided/Reviewed: Healthy Plate  Carbohydrate meal planning  Nutrition Facts label reading  Information Reviewed with: Patient Pt was receptive and exhibited understanding of the material presented    Nutrition Monitoring/Evaluation:  (2/8/2021): Carbohydrate Intake (FH-1.5. 5) Total carbohydrate intake estimated in 24 hours and with meals - pt will have had at least 1 serving at her lunch that she has 3 days per week    MNT Follow-up Visit: 2/22/21 at 11 am    [facility] [employer] for Telehealth:    Daniel Golden is a 39 y.o. female being evaluated through a synchronous (real-time) audio-video technology platform, as a substitution for an in-person encounter, to address the healthcare issues mentioned above. A caregiver was present when appropriate. I was in the office. The patient was at home. The patient and/or her healthcare decision maker, is aware that this patient-initiated, Telehealth encounter on 2/8/2021 is a billable service, with coverage as determined by her insurance carrier. She is aware that she may receive a bill and has provided verbal consent to proceed: Yes. This telehealth encounter occurred during the COVID-19 pandemic and public health emergency. Evaluation of the following organ systems was limited: Vitals/Constitutional/EENT/Resp/CV/GI//MS/Neuro/Skin/Heme-Lymph-Imm.   Pursuant to the emergency declaration under the [address] and the [address] authority and the [employer] and [employer] Act, this Virtual Visit was conducted with patient's (and/or legal guardian's) consent, to reduce the risk of exposure to COVID-19 and provide necessary medical care. --Junior Iraheta RD on 2/8/2021 at 2:26 PM    An electronic signature was used to authenticate this note.  I was in the office for the appointment and time spent: 70 minutes

## 2021-02-22 ENCOUNTER — VIRTUAL VISIT (OUTPATIENT)
Dept: DIABETES SERVICES | Age: 46
End: 2021-02-22
Payer: OTHER GOVERNMENT

## 2021-02-22 DIAGNOSIS — E66.01 OBESITY, MORBID (HCC): ICD-10-CM

## 2021-02-22 DIAGNOSIS — R73.03 PRE-DIABETES: Primary | ICD-10-CM

## 2021-02-22 PROCEDURE — 97803 MED NUTRITION INDIV SUBSEQ: CPT | Performed by: DIETITIAN, REGISTERED

## 2021-02-22 NOTE — PROGRESS NOTES
[facility]  Follow-up Nutrition Appointment    Patient's Name: Jenny Michelle  Date: 2021  Reason for Referral: pre-dm; wt loss  Referral Source: Ari Frazier MD    Nutrition Assessment  Pertinent Medical History:  Past Medical History:   Diagnosis Date    ADHD     History of fusion of spine for scoliosis     Migraines     Osteoarthritis     lower spine     Past Surgical History:   Procedure Laterality Date    HX  SECTION      x1    HX CHOLECYSTECTOMY      HX LAP GASTRIC BYPASS      HX OTHER SURGICAL      double spinal fusion    HX OTHER SURGICAL  2016    lap band removal    HX TONSILLECTOMY      HX WISDOM TEETH EXTRACTION      IR INJ FACET LUMBAR / [address] SINGLE  2019     Patient Active Problem List   Diagnosis Code    Obesity, morbid ([facility] [address] E66.01    Migraine with aura and without status migrainosus, not intractable G43. 109    ADHD F90.9    History of fusion of spine for scoliosis Z98.1, Z87.39       New Pertinent Medications/Supplements:  n/a    New Pertinent Biochemical Data:  Pt said she has had some more fluid retention recently but is trying to increase water intake since she knows she was not getting enough. I suggested she schedule an appt with her provider if increasing water intake for the next couple of weeks did not solve the issue. She understood and said she would. Home blood glucose records: 2 hr after lunch and dinner yesterday, her BG was 99 and 93, respectively. She has woken up some mornings with her BG above 130 and up to 150 but it is usually because she eats late and one time she had a snack before bed because she read that could be good if you wake up with high BGs    Anthropometrics:  Height:   Ht Readings from Last 1 Encounters:   01/15/21 5' 6\" (1.676 m)     Weight:   Wt Readings from Last 1 Encounters:   21 299 lb 6.4 oz (135.8 kg)     Weight hx:    Wt Readings from Last 3 Encounters:   21 299 lb 6.4 oz (135.8 kg)   01/15/21 291 lb (132 kg)   02/19/20 297 lb 12.8 oz (135.1 kg)       Food- and nutrition-related history:  Ms. Erlin Skinner had a carb and protein at all her meals aside from her breakfast - still coffee w collagen and almond milk. She said it was a lot harder than she thought it would be mentally and she was worried about gaining weight. She reports she gained about 5# but then it came back down after continuing to eat more consistently. I told her that her goal was to add more carbs to 2 meals for 3 days per week and she was surprised because she forgot and did it every day. I told her we set those small goals so she knows she can be successful and if she surpasses them then wonderful as long as it is not taking a bigger mental toll on her. We discussed taking a step back and reducing this intake if she feels overwhelmed and this is what a specialist in this area can help with. I asked if she had more questions. She asked what to do next or if there are next steps and we discussed it may be important to not add any more changes as long as she still has this anxiety associated with her food intake. She is still drinking the almond milk with coffee even though she likes dairy milk better to which I said we can tackle that another time because if that saves her from having more anxiety than we will keep it the way it is for now. She understood and was agreeable. She and her family went to [facility] and she said had some \"less healthy choices. \" One being 3 slices of pizza. She reports she did not feel too great physically or mentally afterward as in she felt like she ate too much and like she should not have eaten it. We discussed that it is okay to allow herself to have those foods sometimes and the most important thing is to not beat herself up for it. She understands and even said that she told herself it was okay.    She had an appt with a counselor a couple weeks ago and she said she is going to try and find another one because they did not click like she wanted. She thought that was a little weird and she was being picky but I stressed the importance of having a good relationship with your therapist because it will make her more open and honest and able to make more progress. I gave her names of RDs/therapists who specialize in disordered eating and HAES. We discussed the difference between eating disorders and disordered eating so she did not think I was diagnosing her with an eating disorder. She verbalized understanding of the difference and how her thoughts and subsequent actions surrounding food intake can be considered disordered eating - skipping meals, feeling guilty after eating carbohydrates, thinking she will gain 300 pounds by eating carbs, etc - and that there are people out there who can help her mend those thoughts and her relationship with food. Her family is super supportive and seems excited to be eating carbs together at meals. We discussed blood glucose targets and how eating more consistently can help prevent high blood sugars in the morning. I emphasized that these targets are for someone who has diabetes and once she realized she was well within limits (aside from some morning sugars) she seemed feel better.     24-hour recall:  Breakfast: coffee w collagen and almond milk  Lunch: turkey and cheese sandwich with cabbage and vinegar slaw  Dinner: baked chicken taquitos (2) w side of rice and salad  Snacks: n/a  Beverages: water - tries to drink two 40 oz bottles per day but not there ey; has not bought more lemonade    Estimated calorie intake per recall: 800-1,000 kcal (Inadequate)  Estimated protein intake per recall: ~40 gm protein (Inadequate)    Activity level: Light activity archery with daughter but still limited    Pt's progress on previous goals: Surpassed goal and is going to continue doing this to try and get past the mental road block    Estimated Nutrition Needs:  Calorie needs (using [address] St Jeor x 1. 2AF): ~2400 kcal/day  Protein needs (using 0.8-1 gm/kg Actual BW): 108-135 gm protein/day    Nutrition Diagnosis: (NI-5.8.4) Inconsistent carbohydrate intake related to food- and nutrition-related knowledge deficit and disordered eating as evidenced by estimated carbohydrate intake that is ingested on an irregular basis. Improved, continued    Nutrition Intervention:  (E-1) Nutrition education - content related to carbohydrate sources and needs at each meal; carbs effects on BG levels and importance of eating them throughout the day  (RC-1) Collaboration and Referral of Nutrition Care - recommend referral to nutrition professional with expertise in disordered eating    Patient Goals: Pt will continue to eat carbs at every meal; will  from list of names I gave her    Resources Provided/Reviewed: blood glucose targets  Information reviewed with: Patient Pt was receptive and exhibited understanding of the material presented    Nutrition Monitoring/Evaluation:  (2/8/2021): Carbohydrate Intake (FH-1.5. 5) Total carbohydrate intake estimated in 24 hours and with meals - pt will have had at least 1 serving at her lunch that she has 3 days per week     MNT Follow-up Visit: going to call if she cannot get appt with specialist    Ayush Espino [employer]ations for Telehealth:    Lauro Melton is a 39 y.o. female being evaluated through a synchronous (real-time) audio-video technology platform, as a substitution for an in-person encounter, to address the healthcare issues mentioned above. A caregiver was present when appropriate. I was in the office. The patient was at home. The patient and/or her healthcare decision maker, is aware that this patient-initiated, Telehealth encounter on 2/22/2021 is a billable service, with coverage as determined by her insurance carrier.  She is aware that she may receive a bill and has provided verbal consent to proceed: Yes. This telehealth encounter occurred during the COVID-19 pandemic and public health emergency. Evaluation of the following organ systems was limited: Vitals/Constitutional/EENT/Resp/CV/GI//MS/Neuro/Skin/Heme-Lymph-Imm. Pursuant to the emergency declaration under the [address] [place] [address], [address] [place] [address] and the [employer] and [employer] Act, this Virtual Visit was conducted with patient's (and/or legal guardian's) consent, to reduce the risk of exposure to COVID-19 and provide necessary medical care. --Alfonso Reyes RD on 2/22/2021 at 11:47 AM    An electronic signature was used to authenticate this note.  I was in the office for the appointment and time spent: 45 minutes

## 2021-02-24 ENCOUNTER — TELEPHONE (OUTPATIENT)
Dept: NEUROLOGY | Age: 46
End: 2021-02-24

## 2021-02-24 ENCOUNTER — OFFICE VISIT (OUTPATIENT)
Dept: NEUROLOGY | Age: 46
End: 2021-02-24
Payer: OTHER GOVERNMENT

## 2021-02-24 VITALS
DIASTOLIC BLOOD PRESSURE: 80 MMHG | HEIGHT: 66 IN | OXYGEN SATURATION: 99 % | BODY MASS INDEX: 46.93 KG/M2 | HEART RATE: 91 BPM | SYSTOLIC BLOOD PRESSURE: 118 MMHG | RESPIRATION RATE: 20 BRPM | WEIGHT: 292 LBS

## 2021-02-24 DIAGNOSIS — G43.809 MIGRAINE VARIANT WITH HEADACHE: Primary | ICD-10-CM

## 2021-02-24 DIAGNOSIS — G43.709 CHRONIC MIGRAINE W/O AURA W/O STATUS MIGRAINOSUS, NOT INTRACTABLE: ICD-10-CM

## 2021-02-24 PROCEDURE — 99205 OFFICE O/P NEW HI 60 MIN: CPT | Performed by: PSYCHIATRY & NEUROLOGY

## 2021-02-24 RX ORDER — TOPIRAMATE 25 MG/1
25 TABLET ORAL 2 TIMES DAILY
Qty: 180 TAB | Refills: 1 | Status: SHIPPED | OUTPATIENT
Start: 2021-02-24 | End: 2021-06-23 | Stop reason: SINTOL

## 2021-02-24 RX ORDER — RIMEGEPANT SULFATE 75 MG/75MG
75 TABLET, ORALLY DISINTEGRATING ORAL
Qty: 8 TAB | Refills: 5 | Status: SHIPPED | OUTPATIENT
Start: 2021-02-24 | End: 2021-06-23

## 2021-02-24 NOTE — PATIENT INSTRUCTIONS
PRESCRIPTION REFILL POLICY [facility] Statement to Patients April 1, 2014 In an effort to ensure the large volume of patient prescription refills is processed in the most efficient and expeditious manner, we are asking our patients to assist us by calling your Pharmacy for all prescription refills, this will include also your  Mail Order Pharmacy. The pharmacy will contact our office electronically to continue the refill process. Please do not wait until the last minute to call your pharmacy. We need at least 48 hours (2days) to fill prescriptions. We also encourage you to call your pharmacy before going to  your prescription to make sure it is ready. With regard to controlled substance prescription refill requests (narcotic refills) that need to be picked up at our office, we ask your cooperation by providing us with at least 72 hours (3days) notice that you will need a refill. We will not refill narcotic prescription refill requests after 4:00pm on any weekday, Monday through Thursday, or after 2:00pm on Fridays, or on the weekends. We encourage everyone to explore another way of getting your prescription refill request processed using [employer], our patient web portal through our electronic medical record system. [employer] is an efficient and effective way to communicate your medication request directly to the office and  downloadable as an dali on your smart phone . [employer] also features a review functionality that allows you to view your medication list as well as leave messages for your physician. Are you ready to get connected? If so please review the attatched instructions or speak to any of our staff to get you set up right away! Thank you so much for your cooperation. Should you have any questions please contact our Practice Administrator. The Physicians and Staff,  [facility]

## 2021-02-24 NOTE — PROGRESS NOTES
Chief Complaint   Patient presents with    Migraine       Referred by: FLORIN Quiñones Judy is a 79-year-old woman with a history of migraines and arthritis who has had migraines ever since about age 15. She is here for new events. I spoke with her personally and reviewed the medical record to include her hospitalization in January. I reviewed the neuroimaging consisting of the MRI and CTA. In brief, she tells me that on the day she presented to the hospital she noticed a sudden severe right-sided head pain and then reportedly she had some slurred speech. She has fragmented memory of this event. She does recall being in the CT scanner. She was evaluated for possible stroke. In addition to the neuroimaging that was done and normal she had a normal echo without shunt. She was placed on Lexapro and aspirin at discharge. After that event she had lingering headache symptoms for about a week and now they still persist several days a week. Her migraines began when she was about 17 always on the right side and at the time was associated with loss of consciousness intermittently. No seizures. Her mother has migraines. Her daughter is developing migraines. Her headaches always remain on the right side sometimes retro-orbital pulsing pounding pain with light sensitivity and sound sensitivity. She works as a  doing work in front of the computer daily. Previous migraine preventative medications: Lexapro      Review of Systems   Eyes: Positive for photophobia. Negative for double vision. Neurological: Positive for headaches. Psychiatric/Behavioral: Positive for memory loss. All other systems reviewed and are negative.       Past Medical History:   Diagnosis Date    ADHD     History of fusion of spine for scoliosis     Migraines     Osteoarthritis     lower spine     Family History   Problem Relation Age of Onset    Other Mother         cerebral palsy    Osteoporosis Mother     Heart Disease Mother         Irving Flores valve\"   [facility] Mother     Hypertension Father     COPD Father     Macular Degen Father     Lung Cancer Maternal Grandmother     Thyroid Disease Maternal Grandmother         hashimoto    Stroke Maternal Grandfather     Cancer Paternal Grandmother         leukemia    Thyroid Disease Maternal Aunt         hashimoto    Heart Attack Paternal Aunt      Social History     Socioeconomic History    Marital status:      Spouse name: Not on file    Number of children: Not on file    Years of education: Not on file    Highest education level: Not on file   Occupational History    Not on file   Social Needs    Financial resource strain: Not on file    Food insecurity     Worry: Not on file     Inability: Not on file   [employer] needs     Medical: Not on file     Non-medical: Not on file   Tobacco Use    Smoking status: Never Smoker    Smokeless tobacco: Never Used   Substance and Sexual Activity    Alcohol use: No     Frequency: Never    Drug use: No    Sexual activity: Yes     Partners: Male     Birth control/protection: Surgical     Comment: coils    Lifestyle    Physical activity     Days per week: Not on file     Minutes per session: Not on file    Stress: Not on file   Relationships    Social connections     Talks on phone: Not on file     Gets together: Not on file     Attends Cheondoism service: Not on file     Active member of club or organization: Not on file     Attends meetings of clubs or organizations: Not on file     Relationship status: Not on file    Intimate partner violence     Fear of current or ex partner: Not on file     Emotionally abused: Not on file     Physically abused: Not on file     Forced sexual activity: Not on file   Other Topics Concern    Not on file   Social History Narrative    Not on file     Current Outpatient Medications   Medication Sig    topiramate (TOPAMAX) 25 mg tablet Take 1 Tab by mouth two (2) times a day.  rimegepant (Nurtec ODT) 75 mg disintegrating tablet Take 1 Tab by mouth daily as needed for Migraine.  escitalopram oxalate (LEXAPRO) 5 mg tablet Take 1 Tab by mouth daily.  aspirin 81 mg chewable tablet Take 1 Tab by mouth daily. No current facility-administered medications for this visit. Allergies   Allergen Reactions    Pcn [Penicillins] Rash     Facial rash         Neurologic Exam     Mental Status   Oriented to person, place, and time. Cranial Nerves   Cranial nerves II through XII intact. Motor Exam   Muscle bulk: normal    Strength   Strength 5/5 throughout. Sensory Exam   Light touch normal.     Gait, Coordination, and Reflexes     Gait  Gait: normal    Coordination   Finger to nose coordination: normal    Tremor   Resting tremor: absent    Reflexes   Right brachioradialis: 2+  Left brachioradialis: 2+  Right biceps: 2+  Left biceps: 2+  Right patellar: 2+  Left patellar: 2+  Right achilles: 2+  Left achilles: 2+    Physical Exam   Constitutional: She is oriented to person, place, and time. She appears well-developed and well-nourished. Cardiovascular: Normal rate. Pulmonary/Chest: Effort normal.   Neurological: She is oriented to person, place, and time. She has normal strength. She has a normal Finger-Nose-Finger Test. Gait normal.   Reflex Scores:       Bicep reflexes are 2+ on the right side and 2+ on the left side. Brachioradialis reflexes are 2+ on the right side and 2+ on the left side. Patellar reflexes are 2+ on the right side and 2+ on the left side. Achilles reflexes are 2+ on the right side and 2+ on the left side. Skin: Skin is warm and dry. Psychiatric: Her behavior is normal.   Vitals reviewed.     Visit Vitals  /80   Pulse 91   Resp 20   Ht 5' 6\" (1.676 m)   Wt 292 lb (132.5 kg)   SpO2 99%   BMI 47.13 kg/m²       Lab Results   Component Value Date/Time    WBC 16.9 (H) 01/16/2021 01:36 AM    HGB 12.7 01/16/2021 01:36 AM    HCT 38.3 01/16/2021 01:36 AM    PLATELET 429 75/01/3179 01:36 AM    MCV 88.5 01/16/2021 01:36 AM     Lab Results   Component Value Date/Time    ALT (SGPT) 28 01/16/2021 01:36 AM    Alk. phosphatase 57 01/16/2021 01:36 AM    Bilirubin, total 0.2 01/16/2021 01:36 AM    Albumin 3.4 (L) 01/16/2021 01:36 AM    Protein, total 6.9 01/16/2021 01:36 AM    INR 1.0 01/14/2021 09:49 PM    Prothrombin time 9.9 01/14/2021 09:49 PM    PLATELET 600 33/56/4798 01:36 AM        CT Results (maximum last 3): Results from [place] encounter on 01/14/21   CT HEAD WO CONT    Narrative EXAM: CT HEAD WO CONT    INDICATION: 24 hours s/p tPA, assess for hemorrhage    COMPARISON: CT earlier today. CONTRAST: None. TECHNIQUE: Unenhanced CT of the head was performed using 5 mm images. Brain and  bone windows were generated. Coronal and sagittal reformats. CT dose reduction  was achieved through use of a standardized protocol tailored for this  examination and automatic exposure control for dose modulation. FINDINGS:  The ventricles and sulci are normal in size, shape and configuration. . There is  no significant white matter disease. There is no intracranial hemorrhage,  extra-axial collection, or mass effect. The basilar cisterns are open. No CT  evidence of acute infarct. The bone windows demonstrate no abnormalities. The visualized portions of the  paranasal sinuses and mastoid air cells are clear. Impression IMPRESSION:   No acute intracranial abnormality. CT HEAD WO CONT    Narrative EXAM: CT HEAD WO CONT    INDICATION: Migraine headache, dysarthria, treated with TPA. COMPARISON: CT head and CT angiography head earlier today. TECHNIQUE: Noncontrast head CT. Coronal and sagittal reformats. CT dose  reduction was achieved through the use of a standardized protocol tailored for  this examination and automatic exposure control for dose modulation.  Adaptive  statistical iterative reconstruction (ASIR) was utilized. FINDINGS: The ventricles and sulci are age-appropriate without hydrocephalus. There is no mass effect or midline shift. There is no intracranial hemorrhage or  extra-axial fluid collection. There is no abnormal area of decreased density to  suggest infarct. Contrast in the vasculature is from previous imaging. The calvarium is intact. The visualized paranasal sinuses and mastoid air cells  are clear. Impression IMPRESSION:     No acute intracranial abnormality on this noncontrast head CT. No change given  difference in technique. MRI Results (maximum last 3): Results from [place] encounter on 01/14/21   MRI BRAIN WO CONT    Narrative CLINICAL HISTORY: CVA. INDICATION: CVA. Possible stroke, complex migraine headaches, right arm and leg  feel uncoordinated. Patient is confused. COMPARISON: CTA 1/15/2021    TECHNIQUE: MR examination of the brain includes axial and sagittal T1, coronal  T2, axial T2, axial FLAIR, axial gradient echo, axial DWI. CONTRAST: None    FINDINGS:   There is no intracranial mass, hemorrhage or evidence of acute infarction. IACs are symmetric in size. The left vertebral artery is dominant. The brain architecture is normal. There is no evidence of midline shift or  mass-effect. There are no extra-axial fluid collections. There is no Chiari or  syrinx. The pituitary and infundibulum are grossly unremarkable. There is no  skull base mass. Cerebellopontine angles are grossly unremarkable. The major  intracranial vascular flow-voids are unremarkable. The cavernous sinuses are  symmetric. Optic chiasm and infundibulum grossly unremarkable. Orbits are  grossly symmetric. Dural venous sinuses are grossly patent. The mastoid air cells are well pneumatized and clear. Impression IMPRESSION:  Normal MRI of the brain. There is no intracranial mass, hemorrhage or evidence of acute infarction.    No acute intracranial process is demonstrated. Results from [place] encounter on 10/21/19   MRI LUMB SPINE WO CONT    Narrative EXAM: MRI LUMB SPINE WO CONT    INDICATION: DDD (degenerative disc disease), lumbar. Scoliosis, unspecified      Exam: MRI of the lumbar spine. Sequences include sagittal and axial T1 and  T2-weighted images. Sagittal STIR. Patient was imaged on the 0.3 Chloe open  magnet. Comparisons: None    Contrast: None. Findings: There is a levoconvex scoliosis of the lumbar spine. There is  extensive artifact from the posterior fusion hardware. There is no evidence of  marrow replacement or acute fracture. Cord terminus is within normal limits. Paraspinous soft tissues are within normal limits. T12-L1: Difficult to evaluate. No obvious canal stenosis    L1-L2: Difficult to evaluate given artifact. No obvious canal stenosis    L2-L3: Difficult to evaluate given artifact. No obvious canal stenosis    L3-L4: There is mild facet hypertrophy. Evaluation is difficult given artifact  but no obvious canal stenosis    L4-L5: There are bilateral facet degenerative changes with facet effusions. There is a small disc bulge. There is slight narrowing of the canal and slight  narrowing of the foramen    L5-S1: There are facet degenerative changes right greater than left. No disc  bulge or stenosis      Impression Impression:  1. Study severely limited by posterior fusion metal artifact. 2. Facet degenerative changes at L4-5 and L5-S1          PET Results (maximum last 3): No results found for this or any previous visit. Assessment and Plan   Diagnoses and all orders for this visit:    1. Migraine variant with headache    2. Chronic migraine w/o aura w/o status migrainosus, not intractable    Other orders  -     topiramate (TOPAMAX) 25 mg tablet; Take 1 Tab by mouth two (2) times a day. -     rimegepant (Nurtec ODT) 75 mg disintegrating tablet; Take 1 Tab by mouth daily as needed for Migraine.       42-year-old woman who has history of migraines that I suspect had an acute migraine variant change prompting the hospitalization. She underwent a stroke evaluation which was appropriate. MRI did not show acute stroke or any evidence of suggest MS or other issue. CTA also benign without aneurysm or vascular disease. I think at this point I would continue treating her for migraine variant and I suspect that she is having a change in her migraines now possibly hormonally induced given her age. Because of the severity of her symptoms I think she should be on a preventative so we are going to start with topiramate. I discussed the side effects with her to include weight loss paresthesias. Start low-dose only. Add magnesium daily. Stay on Lexapro. A trial of Nurtec acutely for migraine variant treatment. She is not a candidate for sumatriptan or similar. We talked about her hospitalization and work-up at length. Questions answered. I would like to reevaluate her in about 3 or 4 months. Call with any questions. I reviewed and decided to continue the current medications. This clinical note was dictated with an electronic dictation software that can make unintentional errors. If there are any questions, please contact me directly for clarification. A notice of this visit/encounter being completed has been sent electronically to the patient's PCP and/or referring provider.      2 [facility], [facility] [address]  Diplomate WILLY

## 2021-02-24 NOTE — TELEPHONE ENCOUNTER
Re: Ilia Vergara approved    Approval rec'd from [address] [place] [address] via [facility]    Effective 01/25/21-12/31/2099  PA # [id number]    Pharmacy notified of approval via [facility].

## 2021-06-18 ENCOUNTER — OFFICE VISIT (OUTPATIENT)
Dept: PRIMARY CARE CLINIC | Age: 46
End: 2021-06-18
Payer: OTHER GOVERNMENT

## 2021-06-18 ENCOUNTER — HOSPITAL ENCOUNTER (OUTPATIENT)
Dept: GENERAL RADIOLOGY | Age: 46
Discharge: HOME | End: 2021-06-18
Attending: NURSE PRACTITIONER
Payer: OTHER GOVERNMENT

## 2021-06-18 VITALS
WEIGHT: 293 LBS | HEART RATE: 90 BPM | TEMPERATURE: 97.5 F | DIASTOLIC BLOOD PRESSURE: 79 MMHG | RESPIRATION RATE: 16 BRPM | BODY MASS INDEX: 47.09 KG/M2 | HEIGHT: 66 IN | SYSTOLIC BLOOD PRESSURE: 142 MMHG | OXYGEN SATURATION: 97 %

## 2021-06-18 DIAGNOSIS — M25.472 LEFT ANKLE SWELLING: ICD-10-CM

## 2021-06-18 DIAGNOSIS — M79.672 LEFT FOOT PAIN: ICD-10-CM

## 2021-06-18 DIAGNOSIS — S90.425A BLISTER OF SECOND TOE OF LEFT FOOT, INITIAL ENCOUNTER: ICD-10-CM

## 2021-06-18 DIAGNOSIS — M25.572 LEFT LATERAL ANKLE PAIN: ICD-10-CM

## 2021-06-18 DIAGNOSIS — R03.0 ELEVATED BLOOD PRESSURE READING: ICD-10-CM

## 2021-06-18 DIAGNOSIS — M79.672 LEFT FOOT PAIN: Primary | ICD-10-CM

## 2021-06-18 DIAGNOSIS — E66.01 OBESITY, MORBID (HCC): ICD-10-CM

## 2021-06-18 PROCEDURE — 73630 X-RAY EXAM OF FOOT: CPT

## 2021-06-18 PROCEDURE — 73610 X-RAY EXAM OF ANKLE: CPT

## 2021-06-18 PROCEDURE — 99214 OFFICE O/P EST MOD 30 MIN: CPT | Performed by: NURSE PRACTITIONER

## 2021-06-18 NOTE — PROGRESS NOTES
Chief Complaint   Patient presents with    Foot Pain     patient says it's been reoccurring for 6 months. 1. Have you been to the ER, urgent care clinic since your last visit? Hospitalized since your last visit? No    2. Have you seen or consulted any other health care providers outside of the [address] [place] [address] since your last visit? Include any pap smears or colon screening.  No  Visit Vitals  BP (!) 142/79 (BP 1 Location: Left upper arm, BP Patient Position: Sitting, BP Cuff Size: Adult)   Pulse 90   Temp 97.5 °F (36.4 °C) (Temporal)   Resp 16   Ht 5' 6\" (1.676 m)   Wt 296 lb 3.2 oz (134.4 kg)   LMP 05/26/2021 (Exact Date)   SpO2 97%   BMI 47.81 kg/m²

## 2021-06-18 NOTE — PROGRESS NOTES
Lawrence Carvajal is a  39 y.o. female presents for visit. Chief Complaint   Patient presents with    Foot Pain     patient says it's been reoccurring for 6 months. Foot Injury   The history is provided by the patient. This is a chronic problem. The current episode started more than 1 week ago (6 months). The problem occurs every several days. The problem has not changed since onset. The pain is present in the left foot and left ankle. The quality of the pain is described as aching. Pain severity now: varies. Associated symptoms include limited range of motion. Pertinent negatives include no numbness and no tingling. The symptoms are aggravated by standing and activity. She has tried OTC pain medications for the symptoms. The treatment provided no relief. History of extremity trauma: unknown. Family history is significant for rheumatoid arthritis and gout. Review of Systems   Constitutional: Negative for chills and fever. HENT: Negative for congestion. Respiratory: Negative for cough and shortness of breath. Cardiovascular: Negative for chest pain and palpitations. Gastrointestinal: Negative for diarrhea, nausea and vomiting. Musculoskeletal: Positive for joint pain. Negative for myalgias. Neurological: Negative for tingling and numbness.         Past Medical History:   Diagnosis Date    ADHD     History of fusion of spine for scoliosis     Migraines     Osteoarthritis     lower spine     Past Surgical History:   Procedure Laterality Date    HX  SECTION      x1    HX CHOLECYSTECTOMY  2011    HX LAP GASTRIC BYPASS  2008    HX OTHER SURGICAL      double spinal fusion    HX OTHER SURGICAL  2016    lap band removal    HX TONSILLECTOMY      HX WISDOM TEETH EXTRACTION      IR INJ FACET LUMBAR / SACRAL SINGLE  2019       Social History     Socioeconomic History    Marital status:      Spouse name: Not on file    Number of children: Not on file    Years of education: Not on file    Highest education level: Not on file   Occupational History    Not on file   Tobacco Use    Smoking status: Never Smoker    Smokeless tobacco: Never Used   Vaping Use    Vaping Use: Never used   Substance and Sexual Activity    Alcohol use: No    Drug use: No    Sexual activity: Yes     Partners: Male     Birth control/protection: Surgical     Comment: coils    Other Topics Concern    Not on file   Social History Narrative    Not on file     Social Determinants of Health     Financial Resource Strain:     Difficulty of Paying Living Expenses:    Food Insecurity:     Worried About Running Out of Food in the Last Year:     [address] in the Last Year:    Transportation Needs:     Lack of Transportation (Medical):      Lack of Transportation (Non-Medical):    Physical Activity:     Days of Exercise per Week:     Minutes of Exercise per Session:    Stress:     Feeling of Stress :    Social Connections:     Frequency of Communication with Friends and Family:     Frequency of Social Gatherings with Friends and Family:     Attends Gnosticism Services:     Active Member of Clubs or Organizations:     Attends Club or Organization Meetings:     Marital Status:    Intimate Partner Violence:     Fear of Current or Ex-Partner:     Emotionally Abused:     Physically Abused:     Sexually Abused:        Family History   Problem Relation Age of Onset    Other Mother         cerebral palsy    Osteoporosis Mother     Heart Disease Mother         [place] [employer] valve\"   [facility] Mother     Hypertension Father     COPD Father     Macular Degen Father     Lung Cancer Maternal Grandmother     Thyroid Disease Maternal Grandmother         hashimoto    Stroke Maternal Grandfather     Cancer Paternal Grandmother         leukemia    Thyroid Disease Maternal Aunt         hashimoto    Heart Attack Paternal Aunt       Prior to Admission medications    Medication Sig Start Date End Date Taking? Authorizing Provider   topiramate (TOPAMAX) 25 mg tablet Take 1 Tab by mouth two (2) times a day. 2/24/21  Yes Eamon Rao DO   rimegepant (Nurtec ODT) 75 mg disintegrating tablet Take 1 Tab by mouth daily as needed for Migraine. 2/24/21  Yes Eamon Rao DO   aspirin 81 mg chewable tablet Take 1 Tab by mouth daily. 1/17/21  Yes Larry Shabazz MD   escitalopram oxalate (LEXAPRO) 5 mg tablet Take 1 Tab by mouth daily. Patient not taking: Reported on 6/18/2021 1/17/21 6/18/21  Larry Shabazz MD      Allergies   Allergen Reactions    Pcn [Penicillins] Rash     Facial rash        Visit Vitals  BP (!) 142/79 (BP 1 Location: Left upper arm, BP Patient Position: Sitting, BP Cuff Size: Adult)   Pulse 90   Temp 97.5 °F (36.4 °C) (Temporal)   Resp 16   Ht 5' 6\" (1.676 m)   Wt 296 lb 3.2 oz (134.4 kg)   LMP 05/26/2021 (Exact Date)   SpO2 97%   BMI 47.81 kg/m²     Physical Exam  Vitals and nursing note reviewed. Constitutional:       General: She is not in acute distress. Appearance: She is well-developed. HENT:      Head: Normocephalic and atraumatic. Right Ear: External ear normal.      Left Ear: External ear normal.      Nose: Nose normal.   Eyes:      Conjunctiva/sclera: Conjunctivae normal.   Cardiovascular:      Rate and Rhythm: Normal rate and regular rhythm. Pulses:           Dorsalis pedis pulses are 2+ on the left side. Posterior tibial pulses are 2+ on the left side. Heart sounds: Normal heart sounds. Pulmonary:      Effort: Pulmonary effort is normal.      Breath sounds: Normal breath sounds. No wheezing. Musculoskeletal:      Right foot: Normal range of motion. Left foot: Decreased range of motion (decreased plantar flexion- increased pain with lateral rotation,). Feet:      Right foot:      Skin integrity: Skin integrity normal.      Left foot:      Skin integrity: Blister (2nd toe left) present.       Toenail Condition: Left toenails are normal.   Skin:     General: Skin is warm and dry. Neurological:      Mental Status: She is alert and oriented to person, place, and time. Psychiatric:         Speech: Speech normal.         Behavior: Behavior normal.               No results found for this or any previous visit (from the past 24 hour(s)). Patient Active Problem List    Diagnosis Date Noted    Obesity, morbid ([facility] [address] [address] 12/24/2018    Migraine with aura and without status migrainosus, not intractable 12/24/2018    ADHD 12/24/2018    History of fusion of spine for scoliosis 12/24/2018         ASSESSMENT AND PLAN:      ICD-10-CM ICD-9-CM   1. Left foot pain  M79.672 729.5   2. Left lateral ankle pain  M25.572 719.47   3. Blister of second toe of left foot, initial encounter  S90.425A 917.2   4. Obesity, morbid ([facility] [address]  E66.01 278.01   5. Left ankle swelling  M25.472 719.07   6. Elevated blood pressure reading  R03.0 796.2     Orders Placed This Encounter    XR ANKLE LT MIN 3 V     Standing Status:   Future     Standing Expiration Date:   7/18/2022     Order Specific Question:   Is Patient Pregnant? Answer:   No     Order Specific Question:   Reason for Exam     Answer:   chronic left lateral ankle pain    XR FOOT LT MIN 3 V     Standing Status:   Future     Standing Expiration Date:   7/18/2022     Order Specific Question:   Is Patient Pregnant? Answer:   No     Order Specific Question:   Reason for Exam     Answer:   chronic left foot pain    REFERRAL TO PODIATRY     Referral Priority:   Routine     Referral Type:   Consultation     Referral Reason:   Specialty Services Required     Referred to Provider:   Cosmo Ramirez MD     Requested Specialty:   Podiatry     Number of Visits Requested:   1     Diagnoses and all orders for this visit:    1.  Left foot pain  -     XR FOOT LT MIN 3 V; Future  -     REFERRAL TO PODIATRY    2. Left lateral ankle pain  -     XR ANKLE LT MIN 3 V; Future  -     REFERRAL TO PODIATRY    3. Blister of second toe of left foot, initial encounter  -     REFERRAL TO PODIATRY    4. Obesity, morbid ([address]    5. Left ankle swelling  -     XR ANKLE LT MIN 3 V; Future  -     REFERRAL TO PODIATRY    6. Elevated blood pressure reading        reviewed diet, exercise and weight control  radiology results and schedule of future radiology studies reviewed with patient  Monitor BP at home. Follow-up and Dispositions    · Return if symptoms worsen or fail to improve. Disclaimer:  Advised her to call back or return to office if symptoms worsen/change/persist.  Discussed expected course/resolution/complications of diagnosis in detail with patient. Medication risks/benefits/alternatives discussed with patient. She was given an after visit summary which includes diagnoses, current medications, & vitals. Discussed patient instructions and advised to read to all patient instructions regarding care. She expressed understanding with the diagnosis and plan.

## 2021-06-18 NOTE — PATIENT INSTRUCTIONS
DASH Diet: Care Instructions  Your Care Instructions     The DASH diet is an eating plan that can help lower your blood pressure. DASH stands for Dietary Approaches to Stop Hypertension. Hypertension is high blood pressure. The DASH diet focuses on eating foods that are high in calcium, potassium, and magnesium. These nutrients can lower blood pressure. The foods that are highest in these nutrients are fruits, vegetables, low-fat dairy products, nuts, seeds, and legumes. But taking calcium, potassium, and magnesium supplements instead of eating foods that are high in those nutrients does not have the same effect. The DASH diet also includes whole grains, fish, and poultry. The DASH diet is one of several lifestyle changes your doctor may recommend to lower your high blood pressure. Your doctor may also want you to decrease the amount of sodium in your diet. Lowering sodium while following the DASH diet can lower blood pressure even further than just the DASH diet alone. Follow-up care is a key part of your treatment and safety. Be sure to make and go to all appointments, and call your doctor if you are having problems. It's also a good idea to know your test results and keep a list of the medicines you take. How can you care for yourself at home? Following the DASH diet  · Eat 4 to 5 servings of fruit each day. A serving is 1 medium-sized piece of fruit, ½ cup chopped or canned fruit, 1/4 cup dried fruit, or 4 ounces (½ cup) of fruit juice. Choose fruit more often than fruit juice. · Eat 4 to 5 servings of vegetables each day. A serving is 1 cup of lettuce or raw leafy vegetables, ½ cup of chopped or cooked vegetables, or 4 ounces (½ cup) of vegetable juice. Choose vegetables more often than vegetable juice. · Get 2 to 3 servings of low-fat and fat-free dairy each day. A serving is 8 ounces of milk, 1 cup of yogurt, or 1 ½ ounces of cheese. · Eat 6 to 8 servings of grains each day.  A serving is 1 slice of bread, 1 ounce of dry cereal, or ½ cup of cooked rice, pasta, or cooked cereal. Try to choose whole-grain products as much as possible. · Limit lean meat, poultry, and fish to 2 servings each day. A serving is 3 ounces, about the size of a deck of cards. · Eat 4 to 5 servings of nuts, seeds, and legumes (cooked dried beans, lentils, and split peas) each week. A serving is 1/3 cup of nuts, 2 tablespoons of seeds, or ½ cup of cooked beans or peas. · Limit fats and oils to 2 to 3 servings each day. A serving is 1 teaspoon of vegetable oil or 2 tablespoons of salad dressing. · Limit sweets and added sugars to 5 servings or less a week. A serving is 1 tablespoon jelly or jam, ½ cup sorbet, or 1 cup of lemonade. · Eat less than 2,300 milligrams (mg) of sodium a day. If you limit your sodium to 1,500 mg a day, you can lower your blood pressure even more. · Be aware that all of these are the suggested number of servings for people who eat 1,800 to 2,000 calories a day. Your recommended number of servings may be different if you need more or fewer calories. Tips for success  · Start small. Do not try to make dramatic changes to your diet all at once. You might feel that you are missing out on your favorite foods and then be more likely to not follow the plan. Make small changes, and stick with them. Once those changes become habit, add a few more changes. · Try some of the following:  ? Make it a goal to eat a fruit or vegetable at every meal and at snacks. This will make it easy to get the recommended amount of fruits and vegetables each day. ? Try yogurt topped with fruit and nuts for a snack or healthy dessert. ? Add lettuce, tomato, cucumber, and onion to sandwiches. ? Combine a ready-made pizza crust with low-fat mozzarella cheese and lots of vegetable toppings. Try using tomatoes, squash, spinach, broccoli, carrots, cauliflower, and onions. ?  Have a variety of cut-up vegetables with a low-fat dip as an appetizer instead of chips and dip. ? Sprinkle sunflower seeds or chopped almonds over salads. Or try adding chopped walnuts or almonds to cooked vegetables. ? Try some vegetarian meals using beans and peas. Add garbanzo or kidney beans to salads. Make burritos and tacos with mashed diop beans or black beans. Where can you learn more? Go to http://www.brown.com/  Enter H967 in the search box to learn more about \"DASH Diet: Care Instructions. \"  Current as of: August 31, 2020               Content Version: 12.8  © 3406-6467 [employer]. Care instructions adapted under license by [employer] (which disclaims liability or warranty for this information). If you have questions about a medical condition or this instruction, always ask your healthcare professional. [address] any warranty or liability for your use of this information.

## 2021-06-20 NOTE — PROGRESS NOTES
Please contact patient regarding abnormal x-ray result. She has a bone spur in her left heel. Otherwise her x-rays are normal.  Going to refer her to a podiatrist for treatment. Please provide referral contact info.   Thanks

## 2021-06-20 NOTE — PROGRESS NOTES
Please let patient know her x-ray showed a bone spur in her left heel. I previously referred her to podiatry and she can follow-up there.   Otherwise x-rays were normal.

## 2021-06-23 ENCOUNTER — OFFICE VISIT (OUTPATIENT)
Dept: NEUROLOGY | Age: 46
End: 2021-06-23
Payer: OTHER GOVERNMENT

## 2021-06-23 VITALS
WEIGHT: 293 LBS | BODY MASS INDEX: 47.09 KG/M2 | DIASTOLIC BLOOD PRESSURE: 84 MMHG | HEIGHT: 66 IN | OXYGEN SATURATION: 98 % | HEART RATE: 90 BPM | SYSTOLIC BLOOD PRESSURE: 138 MMHG

## 2021-06-23 DIAGNOSIS — G43.809 MIGRAINE VARIANT WITH HEADACHE: ICD-10-CM

## 2021-06-23 DIAGNOSIS — T88.7XXA SIDE EFFECT OF MEDICATION: ICD-10-CM

## 2021-06-23 DIAGNOSIS — G43.709 CHRONIC MIGRAINE W/O AURA W/O STATUS MIGRAINOSUS, NOT INTRACTABLE: Primary | ICD-10-CM

## 2021-06-23 PROCEDURE — 99214 OFFICE O/P EST MOD 30 MIN: CPT | Performed by: PSYCHIATRY & NEUROLOGY

## 2021-06-23 RX ORDER — ESCITALOPRAM OXALATE 5 MG/1
5 TABLET ORAL DAILY
COMMUNITY
End: 2021-06-23 | Stop reason: DRUGHIGH

## 2021-06-23 RX ORDER — ESCITALOPRAM OXALATE 10 MG/1
10 TABLET ORAL DAILY
Qty: 90 TABLET | Refills: 1 | Status: SHIPPED | OUTPATIENT
Start: 2021-06-23 | End: 2022-01-12

## 2021-06-23 RX ORDER — RIMEGEPANT SULFATE 75 MG/75MG
75 TABLET, ORALLY DISINTEGRATING ORAL EVERY OTHER DAY
Qty: 16 TABLET | Refills: 5 | Status: SHIPPED | OUTPATIENT
Start: 2021-06-23 | End: 2022-09-12 | Stop reason: SDUPTHER

## 2021-06-23 NOTE — PROGRESS NOTES
Chief Complaint   Patient presents with    Migraine     Follow up, \"having headaches once a week. \"       HPI    Franky Rubio is a 37-year-old woman following up. Since I saw her last we begin low-dose topiramate which she thinks might have been helpful but she is having some cognitive side effects from the medication. She has about 1 breakthrough per week now which is an improvement. She is using Nurtec acutely which has been helpful. She is on low-dose Lexapro but having a lot of stressors now with work. She is also working with a nutritionist now to help with weight loss. She is taking magnesium. Background:  Franky Rubio is a 37-year-old woman with a history of migraines and arthritis who has had migraines ever since about age 15. She is here for new events. I spoke with her personally and reviewed the medical record to include her hospitalization in January. I reviewed the neuroimaging consisting of the MRI and CTA. In brief, she tells me that on the day she presented to the hospital she noticed a sudden severe right-sided head pain and then reportedly she had some slurred speech. She has fragmented memory of this event. She does recall being in the CT scanner. She was evaluated for possible stroke. In addition to the neuroimaging that was done and normal she had a normal echo without shunt. She was placed on Lexapro and aspirin at discharge. After that event she had lingering headache symptoms for about a week and now they still persist several days a week. Her migraines began when she was about 17 always on the right side and at the time was associated with loss of consciousness intermittently. No seizures. Her mother has migraines. Her daughter is developing migraines. Her headaches always remain on the right side sometimes retro-orbital pulsing pounding pain with light sensitivity and sound sensitivity. She works as a  doing work in front of the computer daily.     Previous migraine preventative medications: Lexapro, Nurtec, Topiramate          Review of Systems   Gastrointestinal: Positive for nausea. Neurological: Positive for headaches. All other systems reviewed and are negative. Past Medical History:   Diagnosis Date    ADHD     History of fusion of spine for scoliosis     Migraines     Osteoarthritis     lower spine     Family History   Problem Relation Age of Onset    Other Mother         cerebral palsy    Osteoporosis Mother     Heart Disease Mother         Elenid Nutley valve\"   [facility] Migraines Mother     Hypertension Father     COPD Father     Macular Degen Father     Lung Cancer Maternal Grandmother     Thyroid Disease Maternal Grandmother         hashimoto    Stroke Maternal Grandfather     Cancer Paternal Grandmother         leukemia    Thyroid Disease Maternal Aunt         hashimoto    Heart Attack Paternal Aunt      Social History     Socioeconomic History    Marital status:      Spouse name: Not on file    Number of children: Not on file    Years of education: Not on file    Highest education level: Not on file   Occupational History    Not on file   Tobacco Use    Smoking status: Never Smoker    Smokeless tobacco: Never Used   Vaping Use    Vaping Use: Never used   Substance and Sexual Activity    Alcohol use: No    Drug use: No    Sexual activity: Yes     Partners: Male     Birth control/protection: Surgical     Comment: coils    Other Topics Concern    Not on file   Social History Narrative    Not on file     Social Determinants of Health     Financial Resource Strain:     Difficulty of Paying Living Expenses:    Food Insecurity:     Worried About Running Out of Food in the Last Year:     Ran Out of Food in the Last Year:    Transportation Needs:     Lack of Transportation (Medical):      Lack of Transportation (Non-Medical):    Physical Activity:     Days of Exercise per Week:     Minutes of Exercise per Session:    Stress:  Feeling of Stress :    Social Connections:     Frequency of Communication with Friends and Family:     Frequency of Social Gatherings with Friends and Family:     Attends [employer] Services:     Active Member of Clubs or Organizations:     Attends Club or Organization Meetings:     Marital Status:    Intimate Partner Violence:     Fear of Current or Ex-Partner:     Emotionally Abused:     Physically Abused:     Sexually Abused: Allergies   Allergen Reactions    Pcn [Penicillins] Rash     Facial rash         Current Outpatient Medications   Medication Sig    rimegepant (Nurtec ODT) 75 mg disintegrating tablet Take 1 Tablet by mouth every other day.  escitalopram oxalate (LEXAPRO) 10 mg tablet Take 1 Tablet by mouth daily.  aspirin 81 mg chewable tablet Take 1 Tab by mouth daily. No current facility-administered medications for this visit. Neurologic Exam     Mental Status   WD/WN adult in NAD, normal grooming  VSS  A&O x 3    PERRL, nonicteric  Face is masked  Speech is fluent and clear  No limb ataxia. No abnl movements. Moving all extemities spontaneously and symmetric  Normal gait             Visit Vitals  /84 (BP 1 Location: Left upper arm, BP Patient Position: Sitting)   Pulse 90   Ht 5' 6\" (1.676 m)   Wt 296 lb (134.3 kg)   LMP 05/26/2021 (Exact Date)   SpO2 98%   BMI 47.78 kg/m²       Assessment and Plan   Diagnoses and all orders for this visit:    1. Chronic migraine w/o aura w/o status migrainosus, not intractable    2. Migraine variant with headache    3. Side effect of medication    Other orders  -     rimegepant (Nurtec ODT) 75 mg disintegrating tablet; Take 1 Tablet by mouth every other day. -     escitalopram oxalate (LEXAPRO) 10 mg tablet; Take 1 Tablet by mouth daily. 79-year-old woman who has migraine headaches previously very frequent now with some reduction in frequency.   Unfortunately topiramate is causing side effects I think we need to discontinue that. I am going to increase Lexapro to 10 mg daily. Start Nurtec every other day dosing which is now FDA approved for migraine. Stay on magnesium. Side effects of her medications were discussed. Continue working with a nutritionist for weight loss. We will see her in follow-up. 30 minutes of time was spent reviewing the medical record today prior to face-to-face time, examination time with face-to-face time, completion of documentation. I reviewed and decided to continue the current medications. This clinical note was dictated with an electronic dictation software that can make unintentional errors. If there are any questions, please contact me directly for clarification.       2 [facility], [facility] [address]  Diplomate ABNERN

## 2021-06-23 NOTE — PROGRESS NOTES
Chief Complaint   Patient presents with    Migraine     Follow up, \"having headaches once a week. \"     Visit Vitals  /84 (BP 1 Location: Left upper arm, BP Patient Position: Sitting)   Pulse 90   Ht 5' 6\" (1.676 m)   Wt 296 lb (134.3 kg)   SpO2 98%   BMI 47.78 kg/m²

## 2021-07-21 ENCOUNTER — TELEPHONE (OUTPATIENT)
Dept: PRIMARY CARE CLINIC | Age: 46
End: 2021-07-21

## 2021-07-27 ENCOUNTER — TELEPHONE (OUTPATIENT)
Dept: PRIMARY CARE CLINIC | Age: 46
End: 2021-07-27

## 2021-07-27 NOTE — TELEPHONE ENCOUNTER
Pt needs referral through [facility] for appointment tomorrow. Will let Beata Gallegos know so she can follow up.

## 2021-07-30 ENCOUNTER — TELEPHONE (OUTPATIENT)
Dept: NEUROLOGY | Age: 46
End: 2021-07-30

## 2021-07-30 NOTE — TELEPHONE ENCOUNTER
Re: Nurtec (preventative)    vd fax from pharmacy that pt is traveling to [place] on 08/07/21 and they faxed over a letter showing that plan limitations are 8 per 23 days, approval on file was for acute treatment but pt is now using for preventative treatment of migraine. Created CMM Key# [id number] and submitted medical records showing that provider now wanted pt to take medication every other day for preventative treatment. Awaiting update.

## 2021-09-14 ENCOUNTER — TELEPHONE (OUTPATIENT)
Dept: NEUROLOGY | Age: 46
End: 2021-09-14

## 2021-09-15 NOTE — TELEPHONE ENCOUNTER
Two attempts made to call the patient back without success, unable to leave VM as VM box is full. Will wait for the patient to call back.

## 2022-01-12 RX ORDER — ESCITALOPRAM OXALATE 10 MG/1
TABLET ORAL
Qty: 90 TABLET | Refills: 1 | Status: SHIPPED | OUTPATIENT
Start: 2022-01-12 | End: 2022-09-12 | Stop reason: SDUPTHER

## 2022-03-18 PROBLEM — E66.01 OBESITY, MORBID (HCC): Status: ACTIVE | Noted: 2018-12-24

## 2022-03-19 PROBLEM — Z98.1 HISTORY OF FUSION OF SPINE FOR SCOLIOSIS: Status: ACTIVE | Noted: 2018-12-24

## 2022-03-19 PROBLEM — F90.9 ADHD: Status: ACTIVE | Noted: 2018-12-24

## 2022-03-19 PROBLEM — Z87.39 HISTORY OF FUSION OF SPINE FOR SCOLIOSIS: Status: ACTIVE | Noted: 2018-12-24

## 2022-03-20 PROBLEM — G43.109 MIGRAINE WITH AURA AND WITHOUT STATUS MIGRAINOSUS, NOT INTRACTABLE: Status: ACTIVE | Noted: 2018-12-24

## 2022-09-12 ENCOUNTER — OFFICE VISIT (OUTPATIENT)
Dept: NEUROLOGY | Age: 47
End: 2022-09-12
Payer: OTHER GOVERNMENT

## 2022-09-12 VITALS
HEART RATE: 107 BPM | BODY MASS INDEX: 47.09 KG/M2 | OXYGEN SATURATION: 97 % | RESPIRATION RATE: 18 BRPM | HEIGHT: 66 IN | DIASTOLIC BLOOD PRESSURE: 82 MMHG | SYSTOLIC BLOOD PRESSURE: 130 MMHG | WEIGHT: 293 LBS

## 2022-09-12 DIAGNOSIS — G43.809 MIGRAINE VARIANT WITH HEADACHE: ICD-10-CM

## 2022-09-12 DIAGNOSIS — G43.709 CHRONIC MIGRAINE W/O AURA W/O STATUS MIGRAINOSUS, NOT INTRACTABLE: Primary | ICD-10-CM

## 2022-09-12 PROCEDURE — 99214 OFFICE O/P EST MOD 30 MIN: CPT | Performed by: PSYCHIATRY & NEUROLOGY

## 2022-09-12 RX ORDER — ESCITALOPRAM OXALATE 10 MG/1
10 TABLET ORAL DAILY
Qty: 90 TABLET | Refills: 2 | Status: SHIPPED | OUTPATIENT
Start: 2022-09-12

## 2022-09-12 RX ORDER — RIMEGEPANT SULFATE 75 MG/75MG
75 TABLET, ORALLY DISINTEGRATING ORAL
Qty: 16 TABLET | Refills: 9 | Status: SHIPPED | OUTPATIENT
Start: 2022-09-12

## 2022-09-12 NOTE — PROGRESS NOTES
Chief Complaint   Patient presents with    Follow-up     Chronic migraines: Patient reports migraines were better when on Nurtec. Now it has gotten worse since she is out of the medication.       Visit Vitals  /82 (BP 1 Location: Left upper arm, BP Patient Position: Sitting)   Pulse (!) 107   Resp 18   Ht 5' 6\" (1.676 m)   Wt 304 lb (137.9 kg)   SpO2 97%   BMI 49.07 kg/m²

## 2022-09-12 NOTE — PROGRESS NOTES
Chief Complaint   Patient presents with    Follow-up     Chronic migraines: Patient reports migraines were better when on Nurtec. Now it has gotten worse since she is out of the medication. HPI    61-year-old woman following up on chronic migraine. Last visit she was having a lot of side effects from topiramate so we decided to stop that. We increased Lexapro and added Nurtec every other day and she had excellent results with very rare breakthrough. She did require Nurtec every other day and if she missed the dose she certainly had breakthrough develop. In recent weeks she has been off both of her medications. She is having increasing daily migrainous symptoms with some cognitive slowing and speech changes consistent with her migraine variant. She is trying to lose weight and is seeing a nutritionist.    Migraine medication history: Escitalopram, Nurtec, topiramate    Background:  Michell Giang is a 61-year-old woman with a history of migraines and arthritis who has had migraines ever since about age 15. She is here for new events. I spoke with her personally and reviewed the medical record to include her hospitalization in January. I reviewed the neuroimaging consisting of the MRI and CTA. In brief, she tells me that on the day she presented to the hospital she noticed a sudden severe right-sided head pain and then reportedly she had some slurred speech. She has fragmented memory of this event. She does recall being in the CT scanner. She was evaluated for possible stroke. In addition to the neuroimaging that was done and normal she had a normal echo without shunt. She was placed on Lexapro and aspirin at discharge. After that event she had lingering headache symptoms for about a week and now they still persist several days a week. Her migraines began when she was about 17 always on the right side and at the time was associated with loss of consciousness intermittently. No seizures.   Her mother has migraines. Her daughter is developing migraines. Her headaches always remain on the right side sometimes retro-orbital pulsing pounding pain with light sensitivity and sound sensitivity. She works as a  doing work in front of the computer daily. Review of Systems   Gastrointestinal:  Positive for nausea. Neurological:  Positive for speech change and headaches. All other systems reviewed and are negative.     Past Medical History:   Diagnosis Date    ADHD     History of fusion of spine for scoliosis     Migraines     Osteoarthritis     lower spine     Family History   Problem Relation Age of Onset    Other Mother         cerebral palsy    Osteoporosis Mother     Heart Disease Mother         Loanne Melara valve\"    Migraines Mother     Hypertension Father     COPD Father     Macular Degen Father     Lung Cancer Maternal Grandmother     Thyroid Disease Maternal Grandmother         hashimoto    Stroke Maternal Grandfather     Cancer Paternal Grandmother         leukemia    Thyroid Disease Maternal Aunt         hashimoto    Heart Attack Paternal Aunt      Social History     Socioeconomic History    Marital status:      Spouse name: Not on file    Number of children: Not on file    Years of education: Not on file    Highest education level: Not on file   Occupational History    Not on file   Tobacco Use    Smoking status: Never    Smokeless tobacco: Never   Vaping Use    Vaping Use: Never used   Substance and Sexual Activity    Alcohol use: No    Drug use: No    Sexual activity: Yes     Partners: Male     Birth control/protection: Surgical     Comment: coils    Other Topics Concern    Not on file   Social History Narrative    Not on file     Social Determinants of Health     Financial Resource Strain: Not on file   Food Insecurity: Not on file   Transportation Needs: Not on file   Physical Activity: Not on file   Stress: Not on file   Social Connections: Not on file   Intimate Partner Violence: Not on file   Housing Stability: Not on file     Allergies   Allergen Reactions    Pcn [Penicillins] Rash     Facial rash         Current Outpatient Medications   Medication Sig    escitalopram oxalate (LEXAPRO) 10 mg tablet Take 1 Tablet by mouth daily. Start 1/2 tab for 5 days then increase to whole tab and continue daily    rimegepant (Nurtec ODT) 75 mg disintegrating tablet Take 1 Tablet by mouth every Monday, Wednesday, Friday. aspirin 81 mg chewable tablet Take 1 Tab by mouth daily. (Patient not taking: Reported on 9/12/2022)     No current facility-administered medications for this visit. Neurologic Exam     Mental Status   WD/WN adult in NAD, normal grooming  VSS  A&O x 3    PERRL, nonicteric  Face is covered  Speech is fluent and clear  No limb ataxia. No abnl movements. Moving all extemities spontaneously and symmetric  Normal gait           Visit Vitals  /82 (BP 1 Location: Left upper arm, BP Patient Position: Sitting)   Pulse (!) 107   Resp 18   Ht 5' 6\" (1.676 m)   Wt 304 lb (137.9 kg)   SpO2 97%   BMI 49.07 kg/m²       Assessment and Plan   Diagnoses and all orders for this visit:    1. Chronic migraine w/o aura w/o status migrainosus, not intractable  -     escitalopram oxalate (LEXAPRO) 10 mg tablet; Take 1 Tablet by mouth daily. Start 1/2 tab for 5 days then increase to whole tab and continue daily  -     rimegepant (Nurtec ODT) 75 mg disintegrating tablet; Take 1 Tablet by mouth every Monday, Wednesday, Friday. 2. Migraine variant with headache  -     escitalopram oxalate (LEXAPRO) 10 mg tablet; Take 1 Tablet by mouth daily. Start 1/2 tab for 5 days then increase to whole tab and continue daily  -     rimegepant (Nurtec ODT) 75 mg disintegrating tablet; Take 1 Tablet by mouth every Monday, Wednesday, Friday.       68-year-old woman with a history of chronic migraine, migraine variant with strokelike symptoms who had very good results with combination of Lexapro and Nurtec every other day. I recommend we continue this same regimen. However, we need to start Lexapro low-dose first 5 mg for about 5 days and then she can increase to the more therapeutic 10 mg daily. She is not a candidate for sumatriptan or similar because of her strokelike symptoms which is a contraindication for sumatriptan and similar. If for some reason she does not have as adequate control we could introduce a monthly injectable. She will let me know. Continue seeing a nutritionist.  She would like to follow-up virtually in about 4 months. If she decides to pursue an injection treatment she will let us know and we will bring her in to start that process sooner. I reviewed and decided to continue the current medications. This clinical note was dictated with an electronic dictation software that can make unintentional errors. If there are any questions, please contact me directly for clarification.       2 [facility], [facility] [address]  Diplomate WILLY

## 2022-12-08 ENCOUNTER — TELEPHONE (OUTPATIENT)
Dept: NEUROLOGY | Age: 47
End: 2022-12-08

## 2022-12-08 NOTE — TELEPHONE ENCOUNTER
Patient would like a call from the doctor switching to an alternative shot version of the Nurtec. Patient also stated her her pharmacy informed her today she needs a new PA for Nurtec because it has .       Please contact

## 2022-12-12 NOTE — TELEPHONE ENCOUNTER
Re: Nurtec    Created case in [facility] key# [id number] and rcvd message there is already a PA approval on file through 12/31/2099. Called pharmacy and they were able to process.

## 2022-12-14 NOTE — TELEPHONE ENCOUNTER
If she is happy with Nurtec we can keep it that way but if she wants to come in to switch to an injection she can do so as well.

## 2023-01-16 ENCOUNTER — VIRTUAL VISIT (OUTPATIENT)
Dept: NEUROLOGY | Age: 48
End: 2023-01-16
Payer: COMMERCIAL

## 2023-01-16 DIAGNOSIS — G43.709 CHRONIC MIGRAINE W/O AURA W/O STATUS MIGRAINOSUS, NOT INTRACTABLE: Primary | ICD-10-CM

## 2023-01-16 DIAGNOSIS — G43.809 MIGRAINE VARIANT WITH HEADACHE: ICD-10-CM

## 2023-01-16 PROCEDURE — 99214 OFFICE O/P EST MOD 30 MIN: CPT | Performed by: NURSE PRACTITIONER

## 2023-01-16 RX ORDER — RIMEGEPANT SULFATE 75 MG/75MG
75 TABLET, ORALLY DISINTEGRATING ORAL
Qty: 16 TABLET | Refills: 3 | Status: SHIPPED | OUTPATIENT
Start: 2023-01-16

## 2023-01-16 NOTE — PROGRESS NOTES
[address] [facility],5Th Floor  [address]   [place] [address] [facility] [address]   [phone] Office   [phone] Fax           Date:  23     Name:  Mukul Reyez  :  1975  MRN:  [id number]     PCP:  Vel France, FLORIN    Madelyn Delaney is a 41 y.o. female who was seen by synchronous (real-time) audio-video technology on 2023 for Migraine    Subjective:   Was on Nurtec ODT every other day which worked well. Migraines are described as having a little bit of nausea and fogginess. This will progress to pain around and behind the right eye. Associated with light and sound sensitivity, nausea. Movement makes it worse. These will last about a day. When she has the migraines that are associated with stroke like symptoms, she has right sided facial droop, loss of coordination in her right side, expressive aphasia in addition to the traditional migraine symptoms. It will start with the sharp pain in the right side of her head. Without the Nurtec ODT, she is having the migraines 2-3 times a week. Current Outpatient Medications   Medication Sig    rimegepant (Nurtec ODT) 75 mg disintegrating tablet Take 1 Tablet by mouth every fourty-eight (48) hours. Indications: migraine prevention    ubrogepant (Ubrelvy) 50 mg tablet Take one at onset of migraine. May repeat dose x1 if needed after two hours. Max of two doses in 24 hours    escitalopram oxalate (LEXAPRO) 10 mg tablet Take 1 Tablet by mouth daily. Start 1/2 tab for 5 days then increase to whole tab and continue daily    aspirin 81 mg chewable tablet Take 1 Tab by mouth daily. (Patient not taking: Reported on 2022)     No current facility-administered medications for this visit.      Allergies   Allergen Reactions    Pcn [Penicillins] Rash     Facial rash      Past Medical History:   Diagnosis Date    ADHD     History of fusion of spine for scoliosis Migraines     Osteoarthritis     lower spine     Past Surgical History:   Procedure Laterality Date    HX  SECTION      x1    HX CHOLECYSTECTOMY  2011    HX LAP GASTRIC BYPASS  2008    HX OTHER SURGICAL      double spinal fusion    HX OTHER SURGICAL  2016    lap band removal    HX TONSILLECTOMY      HX WISDOM TEETH EXTRACTION      IR INJ FACET LUMBAR / SACRAL SINGLE  2019      reports that she has never smoked. She has never used smokeless tobacco. She reports that she does not drink alcohol and does not use drugs. family history includes COPD in her father; Cancer in her paternal grandmother; Heart Attack in her paternal aunt; Heart Disease in her mother; Hypertension in her father; Cat Olivera in her maternal grandmother; Jacqueline Dominguez in her father; Migraines in her mother; Osteoporosis in her mother; Other in her mother; Stroke in her maternal grandfather; Thyroid Disease in her maternal aunt and maternal grandmother. ROS    Objective:   No flowsheet data found. General:  Well defined, nourished, and groomed individual in no acute distress. Psych:  Good mood and bright affect    NEUROLOGICAL EXAMINATION:     Mental Status:   Alert and oriented to person, place, and time with recent and remote memory intact. Attention span and concentration are normal. Speech is fluent with a full fund of knowledge. Cranial Nerves:  I: smell Not tested   II: visual fields Not assessed   II: pupils Equal, round, reactive to light   II: optic disc Not assessed   III,VII: ptosis none   III,IV,VI: extraocular muscles  Full ROM   V: mastication normal   V: facial light touch sensation  Not assessed   VII: facial muscle function   symmetric   VIII: hearing symmetric   IX: soft palate elevation  normal   XI: trapezius strength  Not assessed   XI: sternocleidomastoid strength Not assessed   XI: neck flexion strength  Not assessed   XII: tongue  midline     Motor Examination: Normal tone and bulk. Strength was not assessed      Sensory exam:  Not assessed     Coordination:  No resting or intention tremor    Gait and Station:  No muscle wasting or fasiculations noted. Reflexes:  Not assessed    Assessment & Plan:       ICD-10-CM ICD-9-CM    1. Chronic migraine w/o aura w/o status migrainosus, not intractable  G43.709 346.70 rimegepant (Nurtec ODT) 75 mg disintegrating tablet      ubrogepant (Ubrelvy) 50 mg tablet      2. Migraine variant with headache  G43.809 346.20         When she was able to take the Nurtec ODT every other day she did well. Without it she has been experiencing 2-3 migraines per week. Will try to get this therapy reinitiated. Previous preventative strategy includes Escitalopram, Nurtec, topiramate. For acute management, she was provided with Stanley Contras. Purpose and potential side effects were discussed and she has verbalized understanding. Given stroke like symptoms with her migraines, triptan therapy and other vasoconstrictive medications are contraindicated. OTC trials of NSAIDs and Tylenol are ineffective. She was provided education on migraine today to include aura, prodrome, potential triggers, non-pharmacologic and pharmacologic treatment, and pathophysiology of migraine. She will track her headaches and bring the headache diary with her to her next office visit. Follow up in three months      We discussed the expected course, resolution and complications of the diagnosis(es) in detail. Medication risks, benefits, costs, interactions, and alternatives were discussed as indicated. I advised her to contact the office if her condition worsens, changes or fails to improve as anticipated. She expressed understanding with the diagnosis(es) and plan. Jose C Jos, was evaluated through a synchronous (real-time) audio-video encounter. The patient (or guardian if applicable) is aware that this is a billable service, which includes applicable co-pays.  This Virtual Visit was conducted with patient's (and/or legal guardian's) consent. The visit was conducted pursuant to the emergency declaration under the [address] [place] [address] and the [employer] Act. Patient identification was verified, and a caregiver was present when appropriate. The patient was located at: Home: [address] [place] [phone]  The provider was located at:  Other: Deins Floyd NP

## 2023-04-17 ENCOUNTER — VIRTUAL VISIT (OUTPATIENT)
Dept: NEUROLOGY | Age: 48
End: 2023-04-17
Payer: COMMERCIAL

## 2023-04-17 DIAGNOSIS — G43.709 CHRONIC MIGRAINE W/O AURA W/O STATUS MIGRAINOSUS, NOT INTRACTABLE: ICD-10-CM

## 2023-04-17 PROCEDURE — 99214 OFFICE O/P EST MOD 30 MIN: CPT | Performed by: NURSE PRACTITIONER

## 2023-04-17 RX ORDER — ERGOCALCIFEROL 1.25 MG/1
CAPSULE ORAL
COMMUNITY
Start: 2023-02-07

## 2023-04-17 RX ORDER — SEMAGLUTIDE 1.34 MG/ML
INJECTION, SOLUTION SUBCUTANEOUS
COMMUNITY
Start: 2023-03-25

## 2023-04-17 RX ORDER — RIMEGEPANT SULFATE 75 MG/75MG
75 TABLET, ORALLY DISINTEGRATING ORAL
Qty: 16 TABLET | Refills: 3 | Status: SHIPPED | OUTPATIENT
Start: 2023-04-17

## 2023-04-17 NOTE — PROGRESS NOTES
[address] [facility],5Th Floor  [address]   [place] [address] [place] [address] Drive   [phone] Office   [phone] Fax           Date:  23     Name:  Mark Byers  :  1975  MRN:  [id number]     PCP:  Anahi    Gabe Rogers is a 41 y.o. female who was seen by synchronous (real-time) audio-video technology on 2023 for Migraine    Subjective: At her last office visit, we tried to reinitiate Nurtec ODT every other day for migraine prevention. Unfortunately, she really has not been able to take it every other day as she has no PA for it. The Verlena Ebbs was approved for episodic use and this does work but takes an hour to take the edge off and about three hours before the migraine is aborted. Migraines are described as having a little bit of nausea and fogginess. This will progress to pain around and behind the right eye. Associated with light and sound sensitivity, nausea. Movement makes it worse. These will last about a day. When she has the migraines that are associated with stroke like symptoms, she has right sided facial droop, loss of coordination in her right side, expressive aphasia in addition to the traditional migraine symptoms. It will start with the sharp pain in the right side of her head. Without the Nurtec ODT, she is having the migraines 2-3 times a week. Current Outpatient Medications   Medication Sig    Vitamin D2 1,250 mcg (50,000 unit) capsule TAKE 1 CAPSULE BY MOUTH ONCE WEEKLY    Ozempic 0.25 mg or 0.5 mg/dose (2 mg/1.5 ml) subq pen INJECT 0.5 MG SUBCUTANEOUSLY (UNDER THE SKIN) EVERY WEEK    rimegepant (Nurtec ODT) 75 mg disintegrating tablet Take 1 Tablet by mouth every fourty-eight (48) hours. Indications: migraine prevention    ubrogepant (Ubrelvy) 50 mg tablet Take one at onset of migraine. May repeat dose x1 if needed after two hours.  Max of two doses in 24 hours escitalopram oxalate (LEXAPRO) 10 mg tablet Take 1 Tablet by mouth daily. Start 1/2 tab for 5 days then increase to whole tab and continue daily    aspirin 81 mg chewable tablet Take 1 Tab by mouth daily. No current facility-administered medications for this visit. Allergies   Allergen Reactions    Pcn [Penicillins] Rash     Facial rash      Past Medical History:   Diagnosis Date    ADHD     History of fusion of spine for scoliosis     Migraines     Osteoarthritis     lower spine     Past Surgical History:   Procedure Laterality Date    HX  SECTION      x1    HX CHOLECYSTECTOMY  2011    HX LAP GASTRIC BYPASS  2008    HX OTHER SURGICAL      double spinal fusion    HX OTHER SURGICAL  2016    lap band removal    HX TONSILLECTOMY      HX WISDOM TEETH EXTRACTION      IR INJ FACET LUMBAR / SACRAL SINGLE  2019      reports that she has never smoked. She has never used smokeless tobacco. She reports that she does not drink alcohol and does not use drugs. family history includes COPD in her father; Cancer in her paternal grandmother; Heart Attack in her paternal aunt; Heart Disease in her mother; Hypertension in her father; [place] Kei in her maternal grandmother; Luis Cespedes in her father; Migraines in her mother; Osteoporosis in her mother; Other in her mother; Stroke in her maternal grandfather; Thyroid Disease in her maternal aunt and maternal grandmother. ROS    Objective:   No flowsheet data found. General:  Well defined, nourished, and groomed individual in no acute distress. Psych:  Good mood and bright affect    NEUROLOGICAL EXAMINATION:     Mental Status:   Alert and oriented to person, place, and time with recent and remote memory intact. Attention span and concentration are normal. Speech is fluent with a full fund of knowledge.       Cranial Nerves:  I: smell Not tested   II: visual fields Not assessed   II: pupils Equal, round, reactive to light   II: optic disc Not assessed III,VII: ptosis none   III,IV,VI: extraocular muscles  Full ROM   V: mastication normal   V: facial light touch sensation  Not assessed   VII: facial muscle function   symmetric   VIII: hearing symmetric   IX: soft palate elevation  normal   XI: trapezius strength  Not assessed   XI: sternocleidomastoid strength Not assessed   XI: neck flexion strength  Not assessed   XII: tongue  midline     Motor Examination: Normal tone and bulk. Strength was not assessed      Sensory exam:  Not assessed     Coordination:  No resting or intention tremor    Gait and Station:  No muscle wasting or fasiculations noted. Reflexes:  Not assessed    Assessment & Plan:       ICD-10-CM ICD-9-CM    1. Chronic migraine w/o aura w/o status migrainosus, not intractable  G43.709 346.70 rimegepant (Nurtec ODT) 75 mg disintegrating tablet      ubrogepant (Ubrelvy) 100 mg tablet        When she was able to take the Nurtec ODT every other day she did well. Without it she has been experiencing 2-3 migraines per week. Previous preventative strategy includes Escitalopram, Nurtec, topiramate. For acute management, the Cherylle Jagruti was increased to 100mg for effect. Given stroke like symptoms with her migraines, triptan therapy and other vasoconstrictive medications are contraindicated. OTC trials of NSAIDs and Tylenol are ineffective. Follow up in three months    We discussed the expected course, resolution and complications of the diagnosis(es) in detail. Medication risks, benefits, costs, interactions, and alternatives were discussed as indicated. I advised her to contact the office if her condition worsens, changes or fails to improve as anticipated. She expressed understanding with the diagnosis(es) and plan. Chaya Kwon, was evaluated through a synchronous (real-time) audio-video encounter. The patient (or guardian if applicable) is aware that this is a billable service, which includes applicable co-pays.  This Virtual Visit was conducted with patient's (and/or legal guardian's) consent. The visit was conducted pursuant to the emergency declaration under the [address] [place] [address] and the [employer] Act. Patient identification was verified, and a caregiver was present when appropriate. The patient was located at: Home: [address] [place] [phone]  The provider was located at:  Other: Janel Saldana NP

## 2023-07-17 ENCOUNTER — TELEMEDICINE (OUTPATIENT)
Age: 48
End: 2023-07-17
Payer: COMMERCIAL

## 2023-07-17 DIAGNOSIS — G43.919 REFRACTORY MIGRAINE: ICD-10-CM

## 2023-07-17 DIAGNOSIS — G43.709 CHRONIC MIGRAINE WITHOUT AURA, NOT INTRACTABLE, WITHOUT STATUS MIGRAINOSUS: Primary | ICD-10-CM

## 2023-07-17 PROCEDURE — 99214 OFFICE O/P EST MOD 30 MIN: CPT | Performed by: NURSE PRACTITIONER

## 2023-07-17 RX ORDER — AMITRIPTYLINE HYDROCHLORIDE 25 MG/1
25 TABLET, FILM COATED ORAL NIGHTLY PRN
Qty: 30 TABLET | Refills: 0 | Status: SHIPPED | OUTPATIENT
Start: 2023-07-17

## 2023-07-17 RX ORDER — SEMAGLUTIDE 1.34 MG/ML
1 INJECTION, SOLUTION SUBCUTANEOUS WEEKLY
COMMUNITY

## 2023-07-17 NOTE — PROGRESS NOTES
[address] [facility]  [address] [address] [facility], [address] [place], [place] [phone] Office   [phone] Fax           Date:  23     Name:  Issa Lundberg  :  1975  MRN:  [id number]     PCP:  Veronique Guaman, FRANK - [place]    Issa Lundberg is a 41 y.o. female who was seen by synchronous (real-time) audio-video technology on 2023 for Migraine    Subjective: Went to [facility] recently and has had a migraine a couple of days prior to this. Between the stress prior to the trip, the change in sleep pattern, and dehydration. She does continue with Nurtec QOD for migraine prevention. She was given Gustine Chancy for acute management and this has not been helpful for her present migraine. Recap from LV:  When she was able to take the Nurtec ODT every other day she did well. Without it she has been experiencing 2-3 migraines per week. Previous preventative strategy includes Escitalopram, Nurtec, topiramate. For acute management, the Moises Chancy was increased to 100mg for effect. Given stroke like symptoms with her migraines, triptan therapy and other vasoconstrictive medications are contraindicated. OTC trials of NSAIDs and Tylenol are ineffective. Follow up in three months    Current Outpatient Medications   Medication Sig    Semaglutide, 1 MG/DOSE, (OZEMPIC, 1 MG/DOSE,) 2 MG/1.5ML SOPN Inject 1 mg into the skin once a week    aspirin 81 MG chewable tablet Take 1 tablet by mouth daily    escitalopram (LEXAPRO) 10 MG tablet Take 1 tablet by mouth daily    Rimegepant Sulfate (NURTEC) 75 MG TBDP Take 75 mg by mouth every 48 hours    Ubrogepant 100 MG TABS      No current facility-administered medications for this visit.      Allergies   Allergen Reactions    Penicillins Rash     Facial rash      Past Medical History:   Diagnosis Date    ADHD     History of fusion of spine for scoliosis     Migraines

## 2023-10-16 RX ORDER — RIMEGEPANT SULFATE 75 MG/75MG
75 TABLET, ORALLY DISINTEGRATING ORAL
Qty: 30 TABLET | Refills: 2 | Status: SHIPPED | OUTPATIENT
Start: 2023-10-16

## 2024-01-15 ENCOUNTER — TELEMEDICINE (OUTPATIENT)
Age: 49
End: 2024-01-15
Payer: COMMERCIAL

## 2024-01-15 DIAGNOSIS — G43.709 CHRONIC MIGRAINE WITHOUT AURA, NOT INTRACTABLE, WITHOUT STATUS MIGRAINOSUS: Primary | ICD-10-CM

## 2024-01-15 PROCEDURE — 99213 OFFICE O/P EST LOW 20 MIN: CPT | Performed by: NURSE PRACTITIONER

## 2024-01-15 RX ORDER — RIMEGEPANT SULFATE 75 MG/75MG
75 TABLET, ORALLY DISINTEGRATING ORAL
Qty: 30 TABLET | Refills: 2 | Status: SHIPPED | OUTPATIENT
Start: 2024-01-15

## 2024-01-15 NOTE — PROGRESS NOTES
[facility]  [facility]  NEUROLOGY CLINIC   [address] [facility] [address] [place] [address]   [phone] Office   [phone] Fax           Date:  01/15/24     Name:  CHRISTINE MOORE  :  1975  MRN:  [id number]     PCP:  Lorna, Pcp    Christine Moore is a 48 y.o. female who was seen by synchronous (real-time) audio-video technology on 1/15/2024 for No chief complaint on file.    Subjective:   Due to an increase in stress at work, she was having a lot of anxiety and panic attacks. Her blood pressures and blood sugars went up and her headaches worsened. She was out on short term disability for seven weeks. During that time, her health issues improved. Blood pressures and the blood sugar levels came down. Her anxiety was better. Her migraines decreased and have become more manageable. She has been using the Nurtec ODT every other day for migraine prevention. She does not have a rescue medication. The Ubelvy 100mg works well for migraine rescue but her insurance would cover this. At this point, she is having a migraine once every couple of weeks. At this point, she is in between jobs.     Recap from LV:  Migraine headaches which have been manageable with the Nurtec ODT. However, she has developed a more refractory migraine lately which seems to have been triggered by stress, travel, dehydration, and change in sleep pattern. Will try to break the headache cycle with Elavil. Continue with Nurtec for prevention. I would like for her to try the Ubrelvy again for acute management once this migraine is resolved. Follow up in six months or sooner if needed.     Current Outpatient Medications   Medication Sig    Rimegepant Sulfate (NURTEC) 75 MG TBDP Take 75 mg by mouth every 48 hours    Semaglutide, 1 MG/DOSE, (OZEMPIC, 1 MG/DOSE,) 2 MG/1.5ML SOPN Inject 1 mg into the skin once a week    amitriptyline (ELAVIL) 25 MG tablet Take 1 tablet by

## 2024-03-22 ENCOUNTER — TELEPHONE (OUTPATIENT)
Age: 49
End: 2024-03-22

## 2024-03-25 NOTE — TELEPHONE ENCOUNTER
Re: grace GRACE in epic Key# [id number], per response from  : Please advise the pharmacy to first submit request to members primary plan. I only see a [facility] plan now under pharmacy details, looks like last approvals were completed through [facility], sent update to nurse. Unable to locate primary plan, pt will need to update pharmacy if there is another primary COB.

## 2024-05-14 DIAGNOSIS — G43.709 CHRONIC MIGRAINE WITHOUT AURA, NOT INTRACTABLE, WITHOUT STATUS MIGRAINOSUS: ICD-10-CM

## 2024-05-14 RX ORDER — RIMEGEPANT SULFATE 75 MG/75MG
75 TABLET, ORALLY DISINTEGRATING ORAL EVERY OTHER DAY
Qty: 48 TABLET | Refills: 2 | Status: SHIPPED | OUTPATIENT
Start: 2024-05-14

## 2024-07-15 ENCOUNTER — TELEMEDICINE (OUTPATIENT)
Age: 49
End: 2024-07-15
Payer: OTHER GOVERNMENT

## 2024-07-15 DIAGNOSIS — G43.709 CHRONIC MIGRAINE WITHOUT AURA, NOT INTRACTABLE, WITHOUT STATUS MIGRAINOSUS: ICD-10-CM

## 2024-07-15 PROCEDURE — 99213 OFFICE O/P EST LOW 20 MIN: CPT | Performed by: NURSE PRACTITIONER

## 2024-07-15 RX ORDER — BUPROPION HYDROCHLORIDE 150 MG/1
150 TABLET ORAL EVERY MORNING
COMMUNITY
Start: 2024-01-18

## 2024-07-15 RX ORDER — VALSARTAN AND HYDROCHLOROTHIAZIDE 320; 12.5 MG/1; MG/1
1 TABLET, FILM COATED ORAL DAILY
COMMUNITY
Start: 2024-04-16

## 2024-07-15 RX ORDER — RIMEGEPANT SULFATE 75 MG/75MG
75 TABLET, ORALLY DISINTEGRATING ORAL EVERY OTHER DAY
Qty: 48 TABLET | Refills: 2 | Status: SHIPPED | OUTPATIENT
Start: 2024-07-15

## 2024-07-15 RX ORDER — ERGOCALCIFEROL 1.25 MG/1
50000 CAPSULE ORAL WEEKLY
COMMUNITY

## 2024-07-17 ENCOUNTER — TELEPHONE (OUTPATIENT)
Age: 49
End: 2024-07-17

## 2024-07-18 ENCOUNTER — TELEPHONE (OUTPATIENT)
Age: 49
End: 2024-07-18

## 2024-07-18 NOTE — TELEPHONE ENCOUNTER
Nurtec written for prevention (48 per 90)    Explained in Epic, patient also prescribed for Ubrelvy for PRN use only.     Office visit attached    pending

## 2024-07-19 ENCOUNTER — TELEPHONE (OUTPATIENT)
Age: 49
End: 2024-07-19

## 2024-07-19 NOTE — TELEPHONE ENCOUNTER
Nurtec denied:     Adrianne does not recognize that two oral CGRP's can be prescribed, one for prevention and one for rescue.  Nurtec denied.      No denial letter received yet.     Fyi to provider.

## 2024-07-24 ENCOUNTER — TELEPHONE (OUTPATIENT)
Age: 49
End: 2024-07-24

## 2024-08-06 ENCOUNTER — TELEPHONE (OUTPATIENT)
Age: 49
End: 2024-08-06

## 2024-08-06 NOTE — TELEPHONE ENCOUNTER
Following up on Nurtec denial - request reply from provider for next steps.     Message sent on 7-24.     Adrianne does not recognize that two oral CGRP's can be prescribed, one for prevention and one for rescue.  letter in Media. I    If you want pt to be on both, please write a letter of med nec to justify that pt can be on nurtec for prevention and Ubrelvy for acute.      Details are outlined in the letter.

## 2024-08-15 ENCOUNTER — TELEPHONE (OUTPATIENT)
Age: 49
End: 2024-08-15

## 2024-08-15 NOTE — TELEPHONE ENCOUNTER
Nurtec - must have  pt's written consent to appeal    Letter uploaded in Media.     Sent attached letter from [employer] to patient to sign and return and then we will be able to appeal the denial.     [place] Case [id number]    My chart message sent.      FYI to Mina

## 2025-02-24 ENCOUNTER — TELEMEDICINE (OUTPATIENT)
Age: 50
End: 2025-02-24
Payer: COMMERCIAL

## 2025-02-24 DIAGNOSIS — G43.709 CHRONIC MIGRAINE WITHOUT AURA, NOT INTRACTABLE, WITHOUT STATUS MIGRAINOSUS: ICD-10-CM

## 2025-02-24 PROCEDURE — 99213 OFFICE O/P EST LOW 20 MIN: CPT | Performed by: NURSE PRACTITIONER

## 2025-02-24 RX ORDER — RIMEGEPANT SULFATE 75 MG/75MG
75 TABLET, ORALLY DISINTEGRATING ORAL PRN
Qty: 8 TABLET | Refills: 3 | Status: ACTIVE | OUTPATIENT
Start: 2025-02-24

## 2025-02-24 NOTE — PROGRESS NOTES
patient is located as indicated above. If you are not or unsure, please re-schedule the visit: Yes, I confirm.     FRANK Howard NP

## 2025-05-19 ENCOUNTER — TELEMEDICINE (OUTPATIENT)
Age: 50
End: 2025-05-19
Payer: COMMERCIAL

## 2025-05-19 DIAGNOSIS — G43.709 CHRONIC MIGRAINE WITHOUT AURA, NOT INTRACTABLE, WITHOUT STATUS MIGRAINOSUS: Primary | ICD-10-CM

## 2025-05-19 PROCEDURE — 99213 OFFICE O/P EST LOW 20 MIN: CPT | Performed by: NURSE PRACTITIONER

## 2025-05-19 RX ORDER — RIMEGEPANT SULFATE 75 MG/75MG
75 TABLET, ORALLY DISINTEGRATING ORAL PRN
Qty: 8 TABLET | Refills: 3 | Status: ACTIVE | OUTPATIENT
Start: 2025-05-19

## 2025-05-19 RX ORDER — TIRZEPATIDE 12.5 MG/.5ML
INJECTION, SOLUTION SUBCUTANEOUS
COMMUNITY
Start: 2025-05-16

## 2025-05-19 NOTE — PROGRESS NOTES
[facility]  [facility]  NEUROLOGY CLINIC   [address] [facility] [address] [place] [address]   [phone] Office   [phone] Fax           Date:  25     Name:  CHRISTINE MOORE  :  1975  MRN:  [id number]     PCP:  Lorna, Pcp    Christine Moore is a 49 y.o. 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 Despite this, she has only had five migraines in three months. She did have one that lasted three days. She did take the Elavil which broke the headache cycle. She has not been able to get the Nurtec due to insurance confusion when she switched jobs[facility] which does work.     Previous Preventatives: Lexapro (SSRI), presently on Wellbutrin XL, Topiramate (cognitive difficulty) Elavil, Nurtec QOD    Present Preventatives: none    Previous Acute management: Imitrex, Maxalt (note cannot use triptans due to development of migraine associated with stroke like symptoms), Ubrelvy (this did work at 100mg but was hit or miss)    Present acute management strategy: Nurtec     Current Outpatient Medications   Medication Sig    MOUNJARO 12.5 MG/0.5ML SOAJ injection     buPROPion (WELLBUTRIN XL) 150 MG extended release tablet Take 1 tablet by mouth every morning    valsartan-hydroCHLOROthiazide (DIOVAN-HCT) 320-12.5 MG per tablet Take 1 tablet by mouth daily    vitamin D (ERGOCALCIFEROL) 1.25 MG (54512 UT) CAPS capsule Take 1 capsule by mouth once a week    aspirin 81 MG chewable tablet Take 1 tablet by mouth daily    rimegepant sulfate (NURTEC) 75 MG TBDP Take 75 mg by mouth as needed (migraine)    amitriptyline (ELAVIL) 25 MG tablet Take 1 tablet by mouth nightly as needed (migraine)     No current facility-administered medications for this visit.     Allergies   Allergen Reactions    Penicillins Rash

## 2025-07-07 DIAGNOSIS — G43.709 CHRONIC MIGRAINE WITHOUT AURA, NOT INTRACTABLE, WITHOUT STATUS MIGRAINOSUS: ICD-10-CM
